# Patient Record
Sex: MALE | Race: WHITE | NOT HISPANIC OR LATINO | URBAN - METROPOLITAN AREA
[De-identification: names, ages, dates, MRNs, and addresses within clinical notes are randomized per-mention and may not be internally consistent; named-entity substitution may affect disease eponyms.]

---

## 2020-09-14 ENCOUNTER — TELEPHONE (OUTPATIENT)
Dept: ORTHOPEDIC SURGERY | Facility: CLINIC | Age: 40
End: 2020-09-14

## 2020-09-21 ENCOUNTER — OFFICE VISIT (OUTPATIENT)
Dept: ORTHOPEDIC SURGERY | Facility: CLINIC | Age: 40
End: 2020-09-21

## 2020-09-21 VITALS
DIASTOLIC BLOOD PRESSURE: 90 MMHG | SYSTOLIC BLOOD PRESSURE: 135 MMHG | BODY MASS INDEX: 39.14 KG/M2 | WEIGHT: 289 LBS | HEART RATE: 98 BPM | HEIGHT: 72 IN

## 2020-09-21 DIAGNOSIS — M25.561 RIGHT KNEE PAIN, UNSPECIFIED CHRONICITY: Primary | ICD-10-CM

## 2020-09-21 DIAGNOSIS — M17.11 PRIMARY OSTEOARTHRITIS OF RIGHT KNEE: ICD-10-CM

## 2020-09-21 PROCEDURE — 73562 X-RAY EXAM OF KNEE 3: CPT | Performed by: NURSE PRACTITIONER

## 2020-09-21 PROCEDURE — 99203 OFFICE O/P NEW LOW 30 MIN: CPT | Performed by: NURSE PRACTITIONER

## 2020-09-21 RX ORDER — OMEPRAZOLE 20 MG/1
20 CAPSULE, DELAYED RELEASE ORAL DAILY
COMMUNITY
End: 2021-08-22

## 2020-09-21 RX ORDER — LIRAGLUTIDE 6 MG/ML
INJECTION, SOLUTION SUBCUTANEOUS
Status: ON HOLD | COMMUNITY
Start: 2020-08-10 | End: 2021-12-22

## 2020-09-21 RX ORDER — MELOXICAM 15 MG/1
15 TABLET ORAL DAILY
Qty: 30 TABLET | Refills: 2 | Status: SHIPPED | OUTPATIENT
Start: 2020-09-21 | End: 2021-07-09
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
## 2020-09-22 ENCOUNTER — TELEPHONE (OUTPATIENT)
Dept: ORTHOPEDIC SURGERY | Facility: CLINIC | Age: 40
End: 2020-09-22

## 2020-09-22 NOTE — TELEPHONE ENCOUNTER
LM for pt to call and make appt for Gel-One inj B&B Rt knee. Waiting for call back to make an appt.

## 2020-09-23 ENCOUNTER — CLINICAL SUPPORT (OUTPATIENT)
Dept: ORTHOPEDIC SURGERY | Facility: CLINIC | Age: 40
End: 2020-09-23

## 2020-09-23 DIAGNOSIS — M25.561 RIGHT KNEE PAIN, UNSPECIFIED CHRONICITY: Primary | ICD-10-CM

## 2020-09-23 DIAGNOSIS — M17.11 PRIMARY OSTEOARTHRITIS OF RIGHT KNEE: ICD-10-CM

## 2020-09-23 PROCEDURE — 20610 DRAIN/INJ JOINT/BURSA W/O US: CPT | Performed by: NURSE PRACTITIONER

## 2020-09-23 NOTE — PROGRESS NOTES
Procedure   Large Joint Arthrocentesis: R knee  Date/Time: 9/23/2020 8:10 AM  Consent given by: patient  Site marked: site marked  Timeout: Immediately prior to procedure a time out was called to verify the correct patient, procedure, equipment, support staff and site/side marked as required   Supporting Documentation  Indications: pain   Procedure Details  Location: knee - R knee (BILATERAL)  Preparation: Patient was prepped and draped in the usual sterile fashion  Needle size: 20 G  Approach: anterolateral  Medications administered: 30 mg Cross-Linked Hyaluronate 30 MG/3ML  Patient tolerance: patient tolerated the procedure well with no immediate complications      Patient presents to clinic today for right knee viscosupplement injections.  This is the sinle injection of the series.  I explained details of injections as well as risks, benefits and alternatives with the patient today, had all questions answered, wished to proceed with injections.  I will see patient back in 6 weeks for re-evaluation. Patient was instructed to watch for signs or symptoms of infection including redness, swelling, warmth to the touch, or significant increased pain and to contact our office immediately if any of these issues were noted.

## 2020-11-06 ENCOUNTER — OFFICE VISIT (OUTPATIENT)
Dept: ORTHOPEDIC SURGERY | Facility: CLINIC | Age: 40
End: 2020-11-06

## 2020-11-06 VITALS — WEIGHT: 289 LBS | BODY MASS INDEX: 39.14 KG/M2 | HEIGHT: 72 IN

## 2020-11-06 DIAGNOSIS — M17.11 PRIMARY OSTEOARTHRITIS OF RIGHT KNEE: Primary | ICD-10-CM

## 2020-11-06 PROCEDURE — 99214 OFFICE O/P EST MOD 30 MIN: CPT | Performed by: NURSE PRACTITIONER

## 2020-11-06 RX ORDER — METHYLPREDNISOLONE 4 MG/1
TABLET ORAL
Qty: 21 TABLET | Refills: 0 | Status: SHIPPED | OUTPATIENT
Start: 2020-11-06 | End: 2021-08-22
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
## 2021-07-09 RX ORDER — MELOXICAM 15 MG/1
15 TABLET ORAL DAILY
Qty: 30 TABLET | Refills: 1 | Status: SHIPPED | OUTPATIENT
Start: 2021-07-09 | End: 2023-01-11

## 2021-08-10 ENCOUNTER — HOSPITAL ENCOUNTER (OUTPATIENT)
Dept: ULTRASOUND IMAGING | Facility: HOSPITAL | Age: 41
Discharge: HOME | End: 2021-08-10
Admitting: NURSE PRACTITIONER

## 2021-08-10 ENCOUNTER — TRANSCRIBE ORDERS (OUTPATIENT)
Dept: ADMINISTRATIVE | Facility: HOSPITAL | Age: 41
End: 2021-08-10

## 2021-08-10 DIAGNOSIS — R60.0 LOCALIZED EDEMA: ICD-10-CM

## 2021-08-10 DIAGNOSIS — R60.0 LOCALIZED EDEMA: Primary | ICD-10-CM

## 2021-08-10 PROCEDURE — 93971 EXTREMITY STUDY: CPT

## 2021-08-17 ENCOUNTER — OFFICE VISIT (OUTPATIENT)
Dept: ORTHOPEDIC SURGERY | Facility: CLINIC | Age: 41
End: 2021-08-17

## 2021-08-17 VITALS — BODY MASS INDEX: 39.14 KG/M2 | WEIGHT: 289 LBS | HEIGHT: 72 IN

## 2021-08-17 DIAGNOSIS — M17.11 PRIMARY OSTEOARTHRITIS OF RIGHT KNEE: Primary | ICD-10-CM

## 2021-08-17 PROCEDURE — 73562 X-RAY EXAM OF KNEE 3: CPT | Performed by: NURSE PRACTITIONER

## 2021-08-17 PROCEDURE — 20610 DRAIN/INJ JOINT/BURSA W/O US: CPT | Performed by: NURSE PRACTITIONER

## 2021-08-17 PROCEDURE — 99214 OFFICE O/P EST MOD 30 MIN: CPT | Performed by: NURSE PRACTITIONER

## 2021-08-17 RX ORDER — CEPHALEXIN 500 MG/1
CAPSULE ORAL
COMMUNITY
Start: 2021-08-11 | End: 2021-09-15

## 2021-08-17 RX ADMIN — TRIAMCINOLONE ACETONIDE 80 MG: 40 INJECTION, SUSPENSION INTRA-ARTICULAR; INTRAMUSCULAR at 10:39

## 2021-08-17 RX ADMIN — LIDOCAINE HYDROCHLORIDE 8 ML: 10 INJECTION, SOLUTION EPIDURAL; INFILTRATION; INTRACAUDAL; PERINEURAL at 10:39
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
## 2021-08-23 RX ORDER — LIDOCAINE HYDROCHLORIDE 10 MG/ML
8 INJECTION, SOLUTION EPIDURAL; INFILTRATION; INTRACAUDAL; PERINEURAL
Status: COMPLETED | OUTPATIENT
Start: 2021-08-17 | End: 2021-08-17

## 2021-08-23 RX ORDER — TRIAMCINOLONE ACETONIDE 40 MG/ML
80 INJECTION, SUSPENSION INTRA-ARTICULAR; INTRAMUSCULAR
Status: COMPLETED | OUTPATIENT
Start: 2021-08-17 | End: 2021-08-17

## 2021-09-15 ENCOUNTER — OFFICE VISIT (OUTPATIENT)
Dept: ORTHOPEDIC SURGERY | Facility: CLINIC | Age: 41
End: 2021-09-15

## 2021-09-15 VITALS — WEIGHT: 289 LBS | BODY MASS INDEX: 39.14 KG/M2 | HEIGHT: 72 IN

## 2021-09-15 DIAGNOSIS — M17.11 PRIMARY OSTEOARTHRITIS OF RIGHT KNEE: Primary | ICD-10-CM

## 2021-09-15 DIAGNOSIS — M25.561 MECHANICAL KNEE PAIN, RIGHT: ICD-10-CM

## 2021-09-15 PROCEDURE — 99213 OFFICE O/P EST LOW 20 MIN: CPT | Performed by: NURSE PRACTITIONER
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
## 2021-09-30 ENCOUNTER — HOSPITAL ENCOUNTER (OUTPATIENT)
Dept: MRI IMAGING | Facility: HOSPITAL | Age: 41
Discharge: HOME | End: 2021-09-30
Admitting: NURSE PRACTITIONER

## 2021-09-30 DIAGNOSIS — M25.561 MECHANICAL KNEE PAIN, RIGHT: ICD-10-CM

## 2021-09-30 PROCEDURE — 73721 MRI JNT OF LWR EXTRE W/O DYE: CPT

## 2021-10-14 ENCOUNTER — OFFICE VISIT (OUTPATIENT)
Dept: ORTHOPEDIC SURGERY | Facility: CLINIC | Age: 41
End: 2021-10-14

## 2021-10-14 VITALS — WEIGHT: 305 LBS | BODY MASS INDEX: 41.31 KG/M2 | HEIGHT: 72 IN

## 2021-10-14 DIAGNOSIS — S83.231A COMPLEX TEAR OF MEDIAL MENISCUS OF RIGHT KNEE AS CURRENT INJURY, INITIAL ENCOUNTER: Primary | ICD-10-CM

## 2021-10-14 DIAGNOSIS — M17.11 PRIMARY OSTEOARTHRITIS OF RIGHT KNEE: ICD-10-CM

## 2021-10-14 PROCEDURE — 99214 OFFICE O/P EST MOD 30 MIN: CPT | Performed by: ORTHOPAEDIC SURGERY
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
## 2021-10-19 ENCOUNTER — TELEPHONE (OUTPATIENT)
Dept: ORTHOPEDIC SURGERY | Facility: CLINIC | Age: 41
End: 2021-10-19

## 2021-10-21 RX ORDER — MELOXICAM 7.5 MG/1
15 TABLET ORAL ONCE
Status: CANCELLED | OUTPATIENT
Start: 2021-10-21 | End: 2021-10-21

## 2021-10-21 RX ORDER — CEFAZOLIN SODIUM IN 0.9 % NACL 3 G/100 ML
3 INTRAVENOUS SOLUTION, PIGGYBACK (ML) INTRAVENOUS ONCE
Status: CANCELLED | OUTPATIENT
Start: 2021-12-22 | End: 2021-10-21

## 2021-10-21 RX ORDER — ACETAMINOPHEN 325 MG/1
1000 TABLET ORAL ONCE
Status: CANCELLED | OUTPATIENT
Start: 2021-10-21 | End: 2021-10-21

## 2021-10-21 RX ORDER — PREGABALIN 150 MG/1
150 CAPSULE ORAL ONCE
Status: CANCELLED | OUTPATIENT
Start: 2021-10-21 | End: 2021-10-21

## 2021-11-03 ENCOUNTER — TELEPHONE (OUTPATIENT)
Dept: ORTHOPEDIC SURGERY | Facility: CLINIC | Age: 41
End: 2021-11-03

## 2021-11-03 NOTE — TELEPHONE ENCOUNTER
Patients MRI knee is being denied for his upcoming surgery (per the  authorization department). If you'd like to do a peer to peer you need to call [phone], patient ID [id number].  Scheduled for this Friday 11/5 @  [facility].

## 2021-11-05 ENCOUNTER — HOSPITAL ENCOUNTER (OUTPATIENT)
Dept: MRI IMAGING | Facility: HOSPITAL | Age: 41
End: 2021-11-05

## 2021-11-16 ENCOUNTER — TELEPHONE (OUTPATIENT)
Dept: ORTHOPEDIC SURGERY | Facility: CLINIC | Age: 41
End: 2021-11-16

## 2021-11-16 ENCOUNTER — HOSPITAL ENCOUNTER (OUTPATIENT)
Dept: MRI IMAGING | Facility: HOSPITAL | Age: 41
Discharge: HOME | End: 2021-11-16
Admitting: ORTHOPAEDIC SURGERY

## 2021-11-16 DIAGNOSIS — M17.11 PRIMARY OSTEOARTHRITIS OF RIGHT KNEE: ICD-10-CM

## 2021-11-16 PROCEDURE — 73721 MRI JNT OF LWR EXTRE W/O DYE: CPT

## 2021-11-16 NOTE — TELEPHONE ENCOUNTER
I CALLED [facility] TO GET ANOTHER PEER TO PEER REVIEW FOR MRI DONE TODAY. I WAS DENIED, BUT WAS TOLD THAT I CAN CALL [facility] DIRECTLY AND LET THEM KNOW THAT WE WANTED TO APPEAL THE DENIAL FROM [facility].  I  CALLED I SPOKE WITH KEYSHAWN SANDS WITH [facility] AND TOLD HIM THAT WE WANTED TO APPEAL THE FACT THAT [facility] DENIED THE MRI NEEDED FOR PATIENT SURGERY. KEYSHAWN GALLAGHER SAID IT DOES NOT NEED AN AUTH. QUESTIONED HIM 3 TIMES STILL INSISTED WE DID NOT NEED AN AUTH REF # [id number]

## 2021-11-16 NOTE — TELEPHONE ENCOUNTER
CALLED [facility] AGAIN AND GOT MAY P. REF# [id number] AND SHE SAID IT DID NEED AN AUTH. SHE GAVE ME [phone]  TO JOSE C

## 2021-12-07 ENCOUNTER — PRE-ADMISSION TESTING (OUTPATIENT)
Dept: PREADMISSION TESTING | Facility: HOSPITAL | Age: 41
End: 2021-12-07

## 2021-12-07 VITALS
BODY MASS INDEX: 39.89 KG/M2 | SYSTOLIC BLOOD PRESSURE: 151 MMHG | HEIGHT: 73 IN | WEIGHT: 301 LBS | RESPIRATION RATE: 20 BRPM | DIASTOLIC BLOOD PRESSURE: 97 MMHG | OXYGEN SATURATION: 98 % | HEART RATE: 88 BPM

## 2021-12-07 DIAGNOSIS — S83.231A COMPLEX TEAR OF MEDIAL MENISCUS OF RIGHT KNEE AS CURRENT INJURY, INITIAL ENCOUNTER: ICD-10-CM

## 2021-12-07 DIAGNOSIS — M17.11 PRIMARY OSTEOARTHRITIS OF RIGHT KNEE: ICD-10-CM

## 2021-12-07 DIAGNOSIS — M17.11 PRIMARY OSTEOARTHRITIS OF RIGHT KNEE: Primary | ICD-10-CM

## 2021-12-07 LAB
ABO GROUP BLD: NORMAL
ANION GAP SERPL CALCULATED.3IONS-SCNC: 8.7 MMOL/L (ref 5–15)
APTT PPP: 32 SECONDS (ref 24.3–38.1)
BASOPHILS # BLD AUTO: 0.03 10*3/MM3 (ref 0–0.2)
BASOPHILS NFR BLD AUTO: 0.4 % (ref 0–1.5)
BILIRUB UR QL STRIP: NEGATIVE
BLD GP AB SCN SERPL QL: NEGATIVE
BUN SERPL-MCNC: 13 MG/DL (ref 6–20)
BUN/CREAT SERPL: 18.1 (ref 7–25)
CALCIUM SPEC-SCNC: 9.4 MG/DL (ref 8.6–10.5)
CHLORIDE SERPL-SCNC: 101 MMOL/L (ref 98–107)
CLARITY UR: CLEAR
CO2 SERPL-SCNC: 26.3 MMOL/L (ref 22–29)
COLOR UR: YELLOW
CREAT SERPL-MCNC: 0.72 MG/DL (ref 0.76–1.27)
DEPRECATED RDW RBC AUTO: 37.8 FL (ref 37–54)
EOSINOPHIL # BLD AUTO: 0.09 10*3/MM3 (ref 0–0.4)
EOSINOPHIL NFR BLD AUTO: 1.3 % (ref 0.3–6.2)
ERYTHROCYTE [DISTWIDTH] IN BLOOD BY AUTOMATED COUNT: 11.9 % (ref 12.3–15.4)
GFR SERPL CREATININE-BSD FRML MDRD: 120 ML/MIN/1.73
GLUCOSE SERPL-MCNC: 107 MG/DL (ref 65–99)
GLUCOSE UR STRIP-MCNC: NEGATIVE MG/DL
HBA1C MFR BLD: 5.4 % (ref 4.8–5.6)
HCT VFR BLD AUTO: 44.5 % (ref 37.5–51)
HGB BLD-MCNC: 14.9 G/DL (ref 13–17.7)
HGB UR QL STRIP.AUTO: NEGATIVE
IMM GRANULOCYTES # BLD AUTO: 0.03 10*3/MM3 (ref 0–0.05)
IMM GRANULOCYTES NFR BLD AUTO: 0.4 % (ref 0–0.5)
INR PPP: 1.04 (ref 0.9–1.1)
KETONES UR QL STRIP: NEGATIVE
LEUKOCYTE ESTERASE UR QL STRIP.AUTO: NEGATIVE
LYMPHOCYTES # BLD AUTO: 1.27 10*3/MM3 (ref 0.7–3.1)
LYMPHOCYTES NFR BLD AUTO: 17.9 % (ref 19.6–45.3)
MCH RBC QN AUTO: 29 PG (ref 26.6–33)
MCHC RBC AUTO-ENTMCNC: 33.5 G/DL (ref 31.5–35.7)
MCV RBC AUTO: 86.6 FL (ref 79–97)
MONOCYTES # BLD AUTO: 0.41 10*3/MM3 (ref 0.1–0.9)
MONOCYTES NFR BLD AUTO: 5.8 % (ref 5–12)
NEUTROPHILS NFR BLD AUTO: 5.27 10*3/MM3 (ref 1.7–7)
NEUTROPHILS NFR BLD AUTO: 74.2 % (ref 42.7–76)
NITRITE UR QL STRIP: NEGATIVE
NRBC BLD AUTO-RTO: 0 /100 WBC (ref 0–0.2)
PH UR STRIP.AUTO: 6 [PH] (ref 4.5–8)
PLATELET # BLD AUTO: 327 10*3/MM3 (ref 140–450)
PMV BLD AUTO: 10.2 FL (ref 6–12)
POTASSIUM SERPL-SCNC: 4.5 MMOL/L (ref 3.5–5.2)
PROT UR QL STRIP: NEGATIVE
PROTHROMBIN TIME: 13.8 SECONDS (ref 12.1–15)
QT INTERVAL: 389 MS
RBC # BLD AUTO: 5.14 10*6/MM3 (ref 4.14–5.8)
RH BLD: POSITIVE
SODIUM SERPL-SCNC: 136 MMOL/L (ref 136–145)
SP GR UR STRIP: 1.02 (ref 1–1.03)
T&S EXPIRATION DATE: NORMAL
UROBILINOGEN UR QL STRIP: NORMAL
WBC NRBC COR # BLD: 7.1 10*3/MM3 (ref 3.4–10.8)

## 2021-12-07 PROCEDURE — 87081 CULTURE SCREEN ONLY: CPT | Performed by: ORTHOPAEDIC SURGERY

## 2021-12-07 PROCEDURE — 86900 BLOOD TYPING SEROLOGIC ABO: CPT | Performed by: ORTHOPAEDIC SURGERY

## 2021-12-07 PROCEDURE — 36415 COLL VENOUS BLD VENIPUNCTURE: CPT

## 2021-12-07 PROCEDURE — 85025 COMPLETE CBC W/AUTO DIFF WBC: CPT | Performed by: ORTHOPAEDIC SURGERY

## 2021-12-07 PROCEDURE — 85610 PROTHROMBIN TIME: CPT | Performed by: ORTHOPAEDIC SURGERY

## 2021-12-07 PROCEDURE — 81003 URINALYSIS AUTO W/O SCOPE: CPT | Performed by: ORTHOPAEDIC SURGERY

## 2021-12-07 PROCEDURE — 86901 BLOOD TYPING SEROLOGIC RH(D): CPT | Performed by: ORTHOPAEDIC SURGERY

## 2021-12-07 PROCEDURE — 86850 RBC ANTIBODY SCREEN: CPT | Performed by: ORTHOPAEDIC SURGERY

## 2021-12-07 PROCEDURE — 93005 ELECTROCARDIOGRAM TRACING: CPT

## 2021-12-07 PROCEDURE — 80048 BASIC METABOLIC PNL TOTAL CA: CPT | Performed by: ORTHOPAEDIC SURGERY

## 2021-12-07 PROCEDURE — 83036 HEMOGLOBIN GLYCOSYLATED A1C: CPT | Performed by: ORTHOPAEDIC SURGERY

## 2021-12-07 PROCEDURE — 93010 ELECTROCARDIOGRAM REPORT: CPT | Performed by: INTERNAL MEDICINE

## 2021-12-07 PROCEDURE — 85730 THROMBOPLASTIN TIME PARTIAL: CPT | Performed by: ORTHOPAEDIC SURGERY

## 2021-12-07 RX ORDER — PHENTERMINE HYDROCHLORIDE 37.5 MG/1
37.5 TABLET ORAL
COMMUNITY
Start: 2021-11-19 | End: 2022-10-17 | Stop reason: SDUPTHER

## 2021-12-07 ASSESSMENT — KOOS JR
KOOS JR SCORE: 15
KOOS JR SCORE: 50.012

## 2021-12-07 NOTE — PAT
Pt here for PAT visit and joint camp.  Pre-op tests completed, chg soap given, and instructions reviewed.  Instructed clears/Gatorade until 2 hrs PTA, voiced understanding. Covid test 12/20/21. Instructed pt to d/c phentermine until after procedure. Instructed to start bactroban 12/17/21. ERAS explained.

## 2021-12-07 NOTE — DISCHARGE INSTRUCTIONS
PRE-ADMISSION TESTING INSTRUCTIONS FOR TOTAL JOINT PATIENTS    Take these medications the morning of surgery with a small sip of water: DEXILANT      No aspirin, advil, aleve, ibuprofen, naproxen, diet pills, decongestants, or vitamin/herbal supplements for a week prior to your surgery. TYLENOL OK IF NEEDED    Do not take any insulin or diabetes medications the morning of surgery.      Start your Bactroban ointment on __12/17_________.  You will need to apply the ointment with a clean Q-tip 3 times a day in both sides of your nose for 5 days and the morning of surgery.    General Instructions:    • Do not eat solid food after midnight the night before surgery.  No gum, mints, or hard candy after midnight the night before surgery.  • You may drink clear liquids the day of surgery up until 2 hours before your arrival time.  • Clear liquids are liquids you can see through. Nothing RED in color.    Plain water    Sports drinks  Sodas     Gelatin (Jell-O)  Fruit juices without pulp such as white grape juice and apple juice  Popsicles that contain no fruit or yogurt  Tea or coffee (no cream or milk added)    • It is beneficial for you to have a clear drink that contains carbohydrates just before you leave your house and before your fasting time begins.  We suggest a 20 ounce bottle of Gatorade or Powerade for non-diabetic patients or a 20 ounce bottle of G2 or Powerade Zero for diabetic patients.     • Patients who avoid smoking, chewing tobacco and alcohol for 4 weeks prior to surgery have a reduced risk of post-operative complications.  If at all possible, quit smoking as many days before surgery as you can.    • Do not smoke, use chewing tobacco or drink alcohol after midnight the day of surgery.    • Bring your C-PAP/ BI-PAP machine if you use one.  • Wear clean comfortable clothes and socks.  • Do not wear contact lenses, lotion, deodorant, or make-up.  Bring a case for your glasses if applicable.   • You may brush  your teeth the morning of surgery.  • You may wear dentures/partials, do not put adhesive/glue on them.  • Leave all other jewelry and valuables at home.  • NOTIFY YOUR SURGEON IF YOU BECOME ILL, HAVE A FEVER, PRODUCTIVE COUGH, OR CANNOT BE HERE THE DAY OF SURGERY.      Preventing a Surgical Site Infection:    • Shower the night before and on the morning of surgery using the chlorhexidine soap you were given.  Use a clean washcloth with the soap.  Place clean sheets on your bed after showering the night before surgery. Do not use the CHG soap on your hair, face, or private areas. Wash your body gently for five (5) minutes. Do not scrub your skin.  Dry with a clean towel and dress in clean clothing.    • Do not shave the surgical area for 10 days-2 weeks prior to surgery  because the razor can irritate skin and make it easier to develop an infection.  • Make sure you, your family, and all healthcare providers clean their hands with soap and water or an alcohol based hand  before caring for you or your wound.      Day of surgery:    Your surgeon’s office will advise you of your arrival time for the day of surgery.    Upon arrival, a Pre-op nurse and Anesthesia provider will review your health history, obtain vital signs, and answer questions you may have.  The only belongings needed at this time will be your home medications and if applicable your C-PAP/BI-PAP machine.  If you are staying overnight your family can leave the rest of your belongings in the car and bring them to your room later.  A Pre-op nurse will start an IV and you may receive medication in preparation for surgery, including something to help you relax.  Your family will be able to see you in the Pre-op area.  While you are in surgery your family should notify the waiting room  if they leave the waiting room area and provide a contact phone number.    If you have any questions, you can call the Pre-Admission Department at [phone] or your surgeon's office.    Please be aware that surgery does come with discomfort.  We want to make every effort to control your discomfort so please discuss any uncontrolled symptoms with your nurse.   Your doctor will most likely have prescribed pain medications.     You may have bruising or discomfort from the tourniquet used in surgery.     Please leave all luggage in the car the morning of surgery.  You will be transported to your hospital room following the recovery period.  Your family can get your luggage at that time.      You may receive a survey regarding the care you received. Your feedback is very important and will be used to collect the necessary data to help us to continue to provide excellent care.

## 2021-12-08 LAB — MRSA SPEC QL CULT: NORMAL

## 2021-12-16 ENCOUNTER — OFFICE VISIT (OUTPATIENT)
Dept: ORTHOPEDIC SURGERY | Facility: CLINIC | Age: 41
End: 2021-12-16

## 2021-12-16 VITALS — WEIGHT: 301 LBS | HEIGHT: 73 IN | BODY MASS INDEX: 39.89 KG/M2

## 2021-12-16 DIAGNOSIS — M17.11 PRIMARY OSTEOARTHRITIS OF RIGHT KNEE: Primary | ICD-10-CM

## 2021-12-16 PROCEDURE — 99024 POSTOP FOLLOW-UP VISIT: CPT | Performed by: ORTHOPAEDIC SURGERY

## 2021-12-16 NOTE — PROGRESS NOTES
Cc: f/u right knee pain, DJD    Patient presented to clinic today for preoperative history and physical visit in anticipation of upcoming scheduled right partial knee arthroplasty.    I reviewed anatomy and a model of a partial knee arthroplasty, as well as typical postoperative recovery of 6-12 months before maximal recovery, and possible need for rehabilitation stay after hospitalization. We also discussed risks, benefits, and alternatives of procedure with risks including but not limited to neurovascular damage, bleeding, infection, malalignment, chronic pian, failure of implants, osteolysis, loosening of implants, loss of motion, weakness, stiffness, instability, DVT, pulmonary embolus, death, stroke, complex regional pain syndrome, myocardial infarction, and need for additional procedures. Patient understood all these and had all questions answered before consenting to the procedure. No guarantees were given in regards to results from the surgery.  Patient has been medically optimize by his primary care physician.    Postoperative DVT prophylaxis will be with aspirin     I have given patient prescription today as well for Bactroban for nasal swabs     All remaining questions answered today.
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
## 2021-12-20 ENCOUNTER — LAB (OUTPATIENT)
Dept: LAB | Facility: HOSPITAL | Age: 41
End: 2021-12-20

## 2021-12-20 DIAGNOSIS — M17.11 PRIMARY OSTEOARTHRITIS OF RIGHT KNEE: ICD-10-CM

## 2021-12-20 DIAGNOSIS — S83.231A COMPLEX TEAR OF MEDIAL MENISCUS OF RIGHT KNEE AS CURRENT INJURY, INITIAL ENCOUNTER: ICD-10-CM

## 2021-12-20 LAB — SARS-COV-2 RNA PNL SPEC NAA+PROBE: NOT DETECTED

## 2021-12-20 PROCEDURE — 87635 SARS-COV-2 COVID-19 AMP PRB: CPT | Performed by: ORTHOPAEDIC SURGERY

## 2021-12-20 PROCEDURE — C9803 HOPD COVID-19 SPEC COLLECT: HCPCS

## 2021-12-21 ENCOUNTER — PREP FOR SURGERY (OUTPATIENT)
Dept: OTHER | Facility: HOSPITAL | Age: 41
End: 2021-12-21

## 2021-12-21 ENCOUNTER — ANESTHESIA EVENT (OUTPATIENT)
Dept: PERIOP | Facility: HOSPITAL | Age: 41
End: 2021-12-21

## 2021-12-22 ENCOUNTER — APPOINTMENT (OUTPATIENT)
Dept: GENERAL RADIOLOGY | Facility: HOSPITAL | Age: 41
End: 2021-12-22

## 2021-12-22 ENCOUNTER — ANESTHESIA (OUTPATIENT)
Dept: PERIOP | Facility: HOSPITAL | Age: 41
End: 2021-12-22

## 2021-12-22 ENCOUNTER — HOSPITAL ENCOUNTER (OUTPATIENT)
Facility: HOSPITAL | Age: 41
Discharge: HOME | End: 2021-12-23
Attending: ORTHOPAEDIC SURGERY | Admitting: ORTHOPAEDIC SURGERY

## 2021-12-22 DIAGNOSIS — S83.231A COMPLEX TEAR OF MEDIAL MENISCUS OF RIGHT KNEE AS CURRENT INJURY, INITIAL ENCOUNTER: ICD-10-CM

## 2021-12-22 DIAGNOSIS — M17.11 PRIMARY OSTEOARTHRITIS OF RIGHT KNEE: ICD-10-CM

## 2021-12-22 DIAGNOSIS — Z96.651 STATUS POST RIGHT PARTIAL KNEE REPLACEMENT: Primary | ICD-10-CM

## 2021-12-22 PROCEDURE — 76942 ECHO GUIDE FOR BIOPSY: CPT | Performed by: ORTHOPAEDIC SURGERY

## 2021-12-22 PROCEDURE — 25010000002 DEXAMETHASONE PER 1 MG: Performed by: NURSE ANESTHETIST, CERTIFIED REGISTERED

## 2021-12-22 PROCEDURE — 25010000002 MIDAZOLAM PER 1MG: Performed by: NURSE ANESTHETIST, CERTIFIED REGISTERED

## 2021-12-22 PROCEDURE — C1889 IMPLANT/INSERT DEVICE, NOC: HCPCS | Performed by: ORTHOPAEDIC SURGERY

## 2021-12-22 PROCEDURE — 25010000002 DIPHENHYDRAMINE PER 50 MG: Performed by: NURSE ANESTHETIST, CERTIFIED REGISTERED

## 2021-12-22 PROCEDURE — 27446 REVISION OF KNEE JOINT: CPT | Performed by: ORTHOPAEDIC SURGERY

## 2021-12-22 PROCEDURE — 25010000002 ONDANSETRON PER 1 MG: Performed by: NURSE ANESTHETIST, CERTIFIED REGISTERED

## 2021-12-22 PROCEDURE — 73560 X-RAY EXAM OF KNEE 1 OR 2: CPT

## 2021-12-22 PROCEDURE — 25010000002 MORPHINE PER 10 MG: Performed by: ORTHOPAEDIC SURGERY

## 2021-12-22 PROCEDURE — 94799 UNLISTED PULMONARY SVC/PX: CPT

## 2021-12-22 PROCEDURE — 25010000002 VANCOMYCIN 1 G RECONSTITUTED SOLUTION: Performed by: ORTHOPAEDIC SURGERY

## 2021-12-22 PROCEDURE — G0378 HOSPITAL OBSERVATION PER HR: HCPCS

## 2021-12-22 PROCEDURE — 27446 REVISION OF KNEE JOINT: CPT | Performed by: SPECIALIST/TECHNOLOGIST, OTHER

## 2021-12-22 PROCEDURE — C1713 ANCHOR/SCREW BN/BN,TIS/BN: HCPCS | Performed by: ORTHOPAEDIC SURGERY

## 2021-12-22 PROCEDURE — C1776 JOINT DEVICE (IMPLANTABLE): HCPCS | Performed by: ORTHOPAEDIC SURGERY

## 2021-12-22 PROCEDURE — 0 CEFAZOLIN PER 500 MG: Performed by: ORTHOPAEDIC SURGERY

## 2021-12-22 PROCEDURE — 25010000002 PROPOFOL 10 MG/ML EMULSION: Performed by: NURSE ANESTHETIST, CERTIFIED REGISTERED

## 2021-12-22 DEVICE — IMPLANTABLE DEVICE: Type: IMPLANTABLE DEVICE | Site: KNEE | Status: FUNCTIONAL

## 2021-12-22 DEVICE — CMT BONE REFOBACIN R W/GENT 1X40: Type: IMPLANTABLE DEVICE | Site: KNEE | Status: FUNCTIONAL

## 2021-12-22 DEVICE — DEV CONTRL TISS STRATAFIX SPIRAL MNCRYL UD 3/0 PLS 45CM: Type: IMPLANTABLE DEVICE | Site: KNEE | Status: FUNCTIONAL

## 2021-12-22 DEVICE — CAP PART KN VANGUARD M: Type: IMPLANTABLE DEVICE | Site: KNEE | Status: FUNCTIONAL

## 2021-12-22 DEVICE — DEV WND/CLS CONTRL TISS STRATAFIX SYMM PDS PLS CTX 60CM VIL: Type: IMPLANTABLE DEVICE | Site: KNEE | Status: FUNCTIONAL

## 2021-12-22 RX ORDER — BUPIVACAINE HYDROCHLORIDE 2.5 MG/ML
INJECTION, SOLUTION EPIDURAL; INFILTRATION; INTRACAUDAL
Status: COMPLETED | OUTPATIENT
Start: 2021-12-22 | End: 2021-12-22

## 2021-12-22 RX ORDER — ACETAMINOPHEN 500 MG
1000 TABLET ORAL ONCE
Status: COMPLETED | OUTPATIENT
Start: 2021-12-22 | End: 2021-12-22

## 2021-12-22 RX ORDER — ONDANSETRON 4 MG/1
4 TABLET, FILM COATED ORAL EVERY 6 HOURS PRN
Status: DISCONTINUED | OUTPATIENT
Start: 2021-12-22 | End: 2021-12-23 | Stop reason: HOSPADM

## 2021-12-22 RX ORDER — NALOXONE HCL 0.4 MG/ML
0.4 VIAL (ML) INJECTION
Status: DISCONTINUED | OUTPATIENT
Start: 2021-12-22 | End: 2021-12-23 | Stop reason: HOSPADM

## 2021-12-22 RX ORDER — VANCOMYCIN HYDROCHLORIDE 1 G/20ML
INJECTION, POWDER, LYOPHILIZED, FOR SOLUTION INTRAVENOUS AS NEEDED
Status: DISCONTINUED | OUTPATIENT
Start: 2021-12-22 | End: 2021-12-22 | Stop reason: HOSPADM

## 2021-12-22 RX ORDER — LIDOCAINE HYDROCHLORIDE 10 MG/ML
0.5 INJECTION, SOLUTION EPIDURAL; INFILTRATION; INTRACAUDAL; PERINEURAL ONCE AS NEEDED
Status: DISCONTINUED | OUTPATIENT
Start: 2021-12-22 | End: 2021-12-22 | Stop reason: HOSPADM

## 2021-12-22 RX ORDER — OXYCODONE HYDROCHLORIDE 5 MG/1
5 TABLET ORAL EVERY 4 HOURS PRN
Status: DISCONTINUED | OUTPATIENT
Start: 2021-12-22 | End: 2021-12-23 | Stop reason: HOSPADM

## 2021-12-22 RX ORDER — SODIUM CHLORIDE 9 MG/ML
40 INJECTION, SOLUTION INTRAVENOUS AS NEEDED
Status: DISCONTINUED | OUTPATIENT
Start: 2021-12-22 | End: 2021-12-22 | Stop reason: HOSPADM

## 2021-12-22 RX ORDER — ONDANSETRON 2 MG/ML
4 INJECTION INTRAMUSCULAR; INTRAVENOUS ONCE AS NEEDED
Status: COMPLETED | OUTPATIENT
Start: 2021-12-22 | End: 2021-12-22

## 2021-12-22 RX ORDER — SODIUM CHLORIDE, SODIUM LACTATE, POTASSIUM CHLORIDE, CALCIUM CHLORIDE 600; 310; 30; 20 MG/100ML; MG/100ML; MG/100ML; MG/100ML
100 INJECTION, SOLUTION INTRAVENOUS CONTINUOUS
Status: DISCONTINUED | OUTPATIENT
Start: 2021-12-22 | End: 2021-12-23 | Stop reason: HOSPADM

## 2021-12-22 RX ORDER — SODIUM CHLORIDE 0.9 % (FLUSH) 0.9 %
10 SYRINGE (ML) INJECTION AS NEEDED
Status: DISCONTINUED | OUTPATIENT
Start: 2021-12-22 | End: 2021-12-22 | Stop reason: HOSPADM

## 2021-12-22 RX ORDER — PREGABALIN 75 MG/1
75 CAPSULE ORAL EVERY 12 HOURS SCHEDULED
Status: DISCONTINUED | OUTPATIENT
Start: 2021-12-22 | End: 2021-12-23 | Stop reason: HOSPADM

## 2021-12-22 RX ORDER — MORPHINE SULFATE 10 MG/ML
6 INJECTION INTRAMUSCULAR; INTRAVENOUS; SUBCUTANEOUS
Status: DISCONTINUED | OUTPATIENT
Start: 2021-12-22 | End: 2021-12-23 | Stop reason: HOSPADM

## 2021-12-22 RX ORDER — MELOXICAM 7.5 MG/1
15 TABLET ORAL ONCE
Status: COMPLETED | OUTPATIENT
Start: 2021-12-22 | End: 2021-12-22

## 2021-12-22 RX ORDER — MIDAZOLAM HYDROCHLORIDE 2 MG/2ML
1 INJECTION, SOLUTION INTRAMUSCULAR; INTRAVENOUS
Status: COMPLETED | OUTPATIENT
Start: 2021-12-22 | End: 2021-12-22

## 2021-12-22 RX ORDER — MAGNESIUM HYDROXIDE 1200 MG/15ML
LIQUID ORAL AS NEEDED
Status: DISCONTINUED | OUTPATIENT
Start: 2021-12-22 | End: 2021-12-22 | Stop reason: HOSPADM

## 2021-12-22 RX ORDER — DIPHENHYDRAMINE HYDROCHLORIDE 50 MG/ML
12.5 INJECTION INTRAMUSCULAR; INTRAVENOUS ONCE
Status: COMPLETED | OUTPATIENT
Start: 2021-12-22 | End: 2021-12-22

## 2021-12-22 RX ORDER — ONDANSETRON 2 MG/ML
4 INJECTION INTRAMUSCULAR; INTRAVENOUS EVERY 6 HOURS PRN
Status: DISCONTINUED | OUTPATIENT
Start: 2021-12-22 | End: 2021-12-23 | Stop reason: HOSPADM

## 2021-12-22 RX ORDER — FAMOTIDINE 10 MG/ML
20 INJECTION, SOLUTION INTRAVENOUS
Status: COMPLETED | OUTPATIENT
Start: 2021-12-22 | End: 2021-12-22

## 2021-12-22 RX ORDER — DEXAMETHASONE SODIUM PHOSPHATE 4 MG/ML
8 INJECTION, SOLUTION INTRA-ARTICULAR; INTRALESIONAL; INTRAMUSCULAR; INTRAVENOUS; SOFT TISSUE ONCE
Status: COMPLETED | OUTPATIENT
Start: 2021-12-22 | End: 2021-12-22

## 2021-12-22 RX ORDER — PREGABALIN 75 MG/1
150 CAPSULE ORAL ONCE
Status: COMPLETED | OUTPATIENT
Start: 2021-12-22 | End: 2021-12-22

## 2021-12-22 RX ORDER — BUPIVACAINE HYDROCHLORIDE 5 MG/ML
INJECTION, SOLUTION PERINEURAL
Status: COMPLETED | OUTPATIENT
Start: 2021-12-22 | End: 2021-12-22

## 2021-12-22 RX ORDER — FENTANYL CITRATE 50 UG/ML
50 INJECTION, SOLUTION INTRAMUSCULAR; INTRAVENOUS
Status: DISCONTINUED | OUTPATIENT
Start: 2021-12-22 | End: 2021-12-22 | Stop reason: HOSPADM

## 2021-12-22 RX ORDER — ACETAMINOPHEN 325 MG/1
950 TABLET ORAL 3 TIMES DAILY
Status: DISCONTINUED | OUTPATIENT
Start: 2021-12-22 | End: 2021-12-23 | Stop reason: HOSPADM

## 2021-12-22 RX ORDER — SODIUM CHLORIDE, SODIUM LACTATE, POTASSIUM CHLORIDE, CALCIUM CHLORIDE 600; 310; 30; 20 MG/100ML; MG/100ML; MG/100ML; MG/100ML
9 INJECTION, SOLUTION INTRAVENOUS CONTINUOUS
Status: DISCONTINUED | OUTPATIENT
Start: 2021-12-22 | End: 2021-12-22

## 2021-12-22 RX ORDER — SODIUM CHLORIDE 0.9 % (FLUSH) 0.9 %
10 SYRINGE (ML) INJECTION EVERY 12 HOURS SCHEDULED
Status: DISCONTINUED | OUTPATIENT
Start: 2021-12-22 | End: 2021-12-22 | Stop reason: HOSPADM

## 2021-12-22 RX ORDER — OXYCODONE HYDROCHLORIDE 5 MG/1
10 TABLET ORAL EVERY 4 HOURS PRN
Status: DISCONTINUED | OUTPATIENT
Start: 2021-12-22 | End: 2021-12-23 | Stop reason: HOSPADM

## 2021-12-22 RX ORDER — CEFAZOLIN SODIUM IN 0.9 % NACL 3 G/100 ML
3 INTRAVENOUS SOLUTION, PIGGYBACK (ML) INTRAVENOUS ONCE
Status: DISCONTINUED | OUTPATIENT
Start: 2021-12-22 | End: 2021-12-22

## 2021-12-22 RX ORDER — LIDOCAINE HYDROCHLORIDE 20 MG/ML
INJECTION, SOLUTION INFILTRATION; PERINEURAL AS NEEDED
Status: DISCONTINUED | OUTPATIENT
Start: 2021-12-22 | End: 2021-12-22 | Stop reason: SURG

## 2021-12-22 RX ORDER — DEXMEDETOMIDINE HYDROCHLORIDE 100 UG/ML
INJECTION, SOLUTION INTRAVENOUS AS NEEDED
Status: DISCONTINUED | OUTPATIENT
Start: 2021-12-22 | End: 2021-12-22 | Stop reason: SURG

## 2021-12-22 RX ORDER — ASPIRIN 325 MG
325 TABLET ORAL EVERY 12 HOURS SCHEDULED
Status: DISCONTINUED | OUTPATIENT
Start: 2021-12-23 | End: 2021-12-23 | Stop reason: HOSPADM

## 2021-12-22 RX ORDER — MELOXICAM 7.5 MG/1
15 TABLET ORAL DAILY
Status: DISCONTINUED | OUTPATIENT
Start: 2021-12-22 | End: 2021-12-23 | Stop reason: HOSPADM

## 2021-12-22 RX ORDER — ONDANSETRON 2 MG/ML
4 INJECTION INTRAMUSCULAR; INTRAVENOUS ONCE AS NEEDED
Status: DISCONTINUED | OUTPATIENT
Start: 2021-12-22 | End: 2021-12-22 | Stop reason: HOSPADM

## 2021-12-22 RX ORDER — KETAMINE HYDROCHLORIDE 10 MG/ML
INJECTION INTRAMUSCULAR; INTRAVENOUS AS NEEDED
Status: DISCONTINUED | OUTPATIENT
Start: 2021-12-22 | End: 2021-12-22 | Stop reason: SURG

## 2021-12-22 RX ORDER — PANTOPRAZOLE SODIUM 40 MG/1
40 TABLET, DELAYED RELEASE ORAL EVERY MORNING
Status: DISCONTINUED | OUTPATIENT
Start: 2021-12-23 | End: 2021-12-23 | Stop reason: HOSPADM

## 2021-12-22 RX ADMIN — MELOXICAM 15 MG: 7.5 TABLET ORAL at 11:35

## 2021-12-22 RX ADMIN — MORPHINE SULFATE 6 MG: 10 INJECTION, SOLUTION INTRAMUSCULAR; INTRAVENOUS at 18:18

## 2021-12-22 RX ADMIN — BUPIVACAINE HYDROCHLORIDE 8 ML/HR: 5 INJECTION, SOLUTION EPIDURAL; INTRACAUDAL at 16:55

## 2021-12-22 RX ADMIN — DEXMEDETOMIDINE 20 MCG: 100 INJECTION, SOLUTION, CONCENTRATE INTRAVENOUS at 12:46

## 2021-12-22 RX ADMIN — KETAMINE HYDROCHLORIDE 30 MG: 10 INJECTION, SOLUTION INTRAMUSCULAR; INTRAVENOUS at 13:10

## 2021-12-22 RX ADMIN — SODIUM CHLORIDE 3 G: 9 INJECTION, SOLUTION INTRAVENOUS at 13:10

## 2021-12-22 RX ADMIN — PREGABALIN 75 MG: 75 CAPSULE ORAL at 20:26

## 2021-12-22 RX ADMIN — PREGABALIN 150 MG: 75 CAPSULE ORAL at 11:35

## 2021-12-22 RX ADMIN — FAMOTIDINE 20 MG: 10 INJECTION, SOLUTION INTRAVENOUS at 12:25

## 2021-12-22 RX ADMIN — SODIUM CHLORIDE, POTASSIUM CHLORIDE, SODIUM LACTATE AND CALCIUM CHLORIDE 9 ML/HR: 600; 310; 30; 20 INJECTION, SOLUTION INTRAVENOUS at 11:35

## 2021-12-22 RX ADMIN — ACETAMINOPHEN 975 MG: 325 TABLET ORAL at 20:26

## 2021-12-22 RX ADMIN — SODIUM CHLORIDE 1000 MG: 9 INJECTION, SOLUTION INTRAVENOUS at 15:22

## 2021-12-22 RX ADMIN — SODIUM CHLORIDE 3 G: 9 INJECTION, SOLUTION INTRAVENOUS at 20:25

## 2021-12-22 RX ADMIN — ONDANSETRON 4 MG: 2 INJECTION INTRAMUSCULAR; INTRAVENOUS at 12:25

## 2021-12-22 RX ADMIN — BUPIVACAINE HYDROCHLORIDE 20 ML: 2.5 INJECTION, SOLUTION EPIDURAL; INFILTRATION; INTRACAUDAL; PERINEURAL at 12:35

## 2021-12-22 RX ADMIN — MIDAZOLAM HYDROCHLORIDE 1 MG: 1 INJECTION, SOLUTION INTRAMUSCULAR; INTRAVENOUS at 12:25

## 2021-12-22 RX ADMIN — DEXAMETHASONE SODIUM PHOSPHATE 8 MG: 4 INJECTION, SOLUTION INTRAMUSCULAR; INTRAVENOUS at 12:25

## 2021-12-22 RX ADMIN — MIDAZOLAM HYDROCHLORIDE 1 MG: 1 INJECTION, SOLUTION INTRAMUSCULAR; INTRAVENOUS at 12:35

## 2021-12-22 RX ADMIN — MORPHINE SULFATE 6 MG: 10 INJECTION, SOLUTION INTRAMUSCULAR; INTRAVENOUS at 21:34

## 2021-12-22 RX ADMIN — BUPIVACAINE HYDROCHLORIDE 2 ML: 5 INJECTION, SOLUTION PERINEURAL at 13:01

## 2021-12-22 RX ADMIN — MELOXICAM 15 MG: 7.5 TABLET ORAL at 18:33

## 2021-12-22 RX ADMIN — DIPHENHYDRAMINE HYDROCHLORIDE 12.5 MG: 50 INJECTION, SOLUTION INTRAMUSCULAR; INTRAVENOUS at 12:25

## 2021-12-22 RX ADMIN — DEXMEDETOMIDINE 20 MCG: 100 INJECTION, SOLUTION, CONCENTRATE INTRAVENOUS at 12:35

## 2021-12-22 RX ADMIN — SODIUM CHLORIDE 1000 MG: 9 INJECTION, SOLUTION INTRAVENOUS at 13:14

## 2021-12-22 RX ADMIN — OXYCODONE HYDROCHLORIDE 10 MG: 5 TABLET ORAL at 23:01

## 2021-12-22 RX ADMIN — PROPOFOL 100 MCG/KG/MIN: 10 INJECTION, EMULSION INTRAVENOUS at 13:10

## 2021-12-22 RX ADMIN — ACETAMINOPHEN 1000 MG: 500 TABLET, FILM COATED ORAL at 11:35

## 2021-12-22 RX ADMIN — BUPIVACAINE HYDROCHLORIDE 10 ML: 2.5 INJECTION, SOLUTION EPIDURAL; INFILTRATION; INTRACAUDAL at 12:46

## 2021-12-22 RX ADMIN — LIDOCAINE HYDROCHLORIDE 50 MG: 20 INJECTION, SOLUTION INFILTRATION; PERINEURAL at 13:10

## 2021-12-22 NOTE — ANESTHESIA PROCEDURE NOTES
Spinal Block      Patient reassessed immediately prior to procedure    Patient location during procedure: pre-op  Start Time: 12/22/2021 12:55 PM  Stop Time: 12/22/2021 1:01 PM  Indication:at surgeon's request and procedure for pain  Performed By  CRNA: Rupal Parker CRNA  Preanesthetic Checklist  Completed: patient identified, IV checked, site marked, risks and benefits discussed, surgical consent, monitors and equipment checked, pre-op evaluation and timeout performed  Spinal Block Prep:  Patient Position:sitting  Sterile Tech:cap, gloves, mask and sterile barriers  Prep:Betadine  Patient Monitoring:blood pressure monitoring, continuous pulse oximetry and EKG  Spinal Block Procedure  Approach:midline  Guidance:landmark technique and palpation technique  Location:L4-L5  Needle Type:Sprotte  Needle Gauge:25 G  Placement of Spinal needle event:cerebrospinal fluid aspirated  Paresthesia: no  Fluid Appearance:clear  Medications: bupivacaine (MARCAINE) injection 0.5%, 2 mL  Med Administered at 12/22/2021 1:01 PM   Post Assessment  Complications no  Additional Notes  Lidocaine 2% 1ml skin infiltration

## 2021-12-22 NOTE — ANESTHESIA PREPROCEDURE EVALUATION
Anesthesia Evaluation     Patient summary reviewed and Nursing notes reviewed   no history of anesthetic complications:  NPO Solid Status: > 8 hours  NPO Liquid Status: > 2 hours           Airway   Mallampati: II  TM distance: >3 FB  Neck ROM: full  No difficulty expected  Dental      Pulmonary     breath sounds clear to auscultation  (+) a smoker Former,   Cardiovascular - negative cardio ROS  Exercise tolerance: good (4-7 METS)    Rhythm: regular  Rate: normal        Neuro/Psych- negative ROS  GI/Hepatic/Renal/Endo    (+)  GERD well controlled,      Musculoskeletal     Abdominal   (+) obese,    Substance History   (+) alcohol use (1-2 drinks per week),      OB/GYN          Other   arthritis (right knee ),                      Anesthesia Plan    ASA 2     MAC, regional and spinal     intravenous induction     Anesthetic plan, all risks, benefits, and alternatives have been provided, discussed and informed consent has been obtained with: patient.  Use of blood products discussed with patient  Consented to blood products.

## 2021-12-22 NOTE — ANESTHESIA PROCEDURE NOTES
Peripheral Block      Patient reassessed immediately prior to procedure    Patient location during procedure: pre-op  Start time: 12/22/2021 12:39 PM  Stop time: 12/22/2021 1:42 PM  Reason for block: at surgeon's request and post-op pain management  Performed by  CRNA: Rupal Parker CRNA  Preanesthetic Checklist  Completed: patient identified, IV checked, site marked, risks and benefits discussed, surgical consent, monitors and equipment checked, pre-op evaluation and timeout performed  Prep:  Pt Position: supine  Sterile barriers:cap, gloves, mask and washed/disinfected hands  Prep: ChloraPrep  Patient monitoring: blood pressure monitoring, continuous pulse oximetry and EKG  Procedure    Sedation: yes  Performed under: local infiltration  Guidance:ultrasound guided  Images:still images obtained, printed/placed on chart    Laterality:right  Block Type:adductor canal block  Injection Technique:catheter  Needle Type:echogenic  Resistance on Injection: none    Medications Used: bupivacaine PF (MARCAINE) injection 0.25%, 10 mL  Med administered at 12/22/2021 12:46 PM      Medications  Comment:precedex 20mcg added to block    Post Assessment  Injection Assessment: negative aspiration for heme, no paresthesia on injection and incremental injection  Patient Tolerance:comfortable throughout block  Complications:no  Additional Notes  Lidocaine 2% 1ml skin infiltration

## 2021-12-22 NOTE — ANESTHESIA POSTPROCEDURE EVALUATION
Patient: Mark Ballard    Procedure Summary     Date: 12/22/21 Room / Location:  [facility] /  [facility]    Anesthesia Start: 1306 Anesthesia Stop: 1607    Procedure: PArtial  KNEE ARTHROPLASTY AND ALL ASSOCIATED PROCEDURES (Right Knee) Diagnosis:       Complex tear of medial meniscus of right knee as current injury, initial encounter      Primary osteoarthritis of right knee      (Complex tear of medial meniscus of right knee as current injury, initial encounter [S83.231A])      (Primary osteoarthritis of right knee [M17.11])    Surgeons: Job Sanchez MD Provider: Ana Nolasco CRNA    Anesthesia Type: MAC, regional, spinal ASA Status: 2          Anesthesia Type: MAC, regional, spinal    Vitals  Vitals Value Taken Time   /83 12/22/21 1635   Temp 97.5 °F (36.4 °C) 12/22/21 1606   Pulse 69 12/22/21 1639   Resp 17 12/22/21 1630   SpO2 94 % 12/22/21 1639   Vitals shown include unvalidated device data.        Post Anesthesia Care and Evaluation    Patient location during evaluation: PHASE II  Patient participation: complete - patient participated  Level of consciousness: awake  Pain score: 0  Pain management: adequate  Airway patency: patent  Anesthetic complications: No anesthetic complications  PONV Status: none  Cardiovascular status: acceptable  Respiratory status: acceptable  Hydration status: acceptable

## 2021-12-22 NOTE — ANESTHESIA PROCEDURE NOTES
Peripheral Block      Patient reassessed immediately prior to procedure    Patient location during procedure: pre-op  Start time: 12/22/2021 12:34 PM  Stop time: 12/22/2021 12:35 PM  Reason for block: at surgeon's request and post-op pain management  Performed by  CRNA: Rupal Parker CRNA  Preanesthetic Checklist  Completed: patient identified, IV checked, site marked, risks and benefits discussed, surgical consent, monitors and equipment checked, pre-op evaluation and timeout performed  Prep:  Pt Position: supine  Sterile barriers:cap, gloves, mask and washed/disinfected hands  Prep: ChloraPrep  Patient monitoring: blood pressure monitoring, continuous pulse oximetry and EKG  Procedure    Sedation: yes  Performed under: local infiltration  Guidance:ultrasound guided  Images:still images obtained, printed/placed on chart    Laterality:right  Block Type:iPack  Injection Technique:single-shot  Needle Type:echogenic  Needle Gauge:21 G  Resistance on Injection: none    Medications Used: bupivacaine PF (MARCAINE) injection 0.25%, 20 mL  Med administered at 12/22/2021 12:35 PM      Medications  Comment:precedex 20mcg added to block    Post Assessment  Injection Assessment: negative aspiration for heme, no paresthesia on injection and incremental injection  Patient Tolerance:comfortable throughout block  Complications:no  Additional Notes  Lidocaine 2% 1ml skin infiltration

## 2021-12-23 VITALS
SYSTOLIC BLOOD PRESSURE: 131 MMHG | TEMPERATURE: 97.6 F | RESPIRATION RATE: 18 BRPM | OXYGEN SATURATION: 96 % | DIASTOLIC BLOOD PRESSURE: 88 MMHG | WEIGHT: 298.8 LBS | HEART RATE: 77 BPM | BODY MASS INDEX: 39.42 KG/M2

## 2021-12-23 LAB
ANION GAP SERPL CALCULATED.3IONS-SCNC: 9.4 MMOL/L (ref 5–15)
BASOPHILS # BLD AUTO: 0.02 10*3/MM3 (ref 0–0.2)
BASOPHILS NFR BLD AUTO: 0.2 % (ref 0–1.5)
BUN SERPL-MCNC: 13 MG/DL (ref 6–20)
BUN/CREAT SERPL: 15.5 (ref 7–25)
CALCIUM SPEC-SCNC: 8.2 MG/DL (ref 8.6–10.5)
CHLORIDE SERPL-SCNC: 103 MMOL/L (ref 98–107)
CO2 SERPL-SCNC: 23.6 MMOL/L (ref 22–29)
CREAT SERPL-MCNC: 0.84 MG/DL (ref 0.76–1.27)
DEPRECATED RDW RBC AUTO: 38.8 FL (ref 37–54)
EOSINOPHIL # BLD AUTO: 0.01 10*3/MM3 (ref 0–0.4)
EOSINOPHIL NFR BLD AUTO: 0.1 % (ref 0.3–6.2)
ERYTHROCYTE [DISTWIDTH] IN BLOOD BY AUTOMATED COUNT: 11.9 % (ref 12.3–15.4)
GFR SERPL CREATININE-BSD FRML MDRD: 101 ML/MIN/1.73
GLUCOSE SERPL-MCNC: 142 MG/DL (ref 65–99)
HCT VFR BLD AUTO: 40.2 % (ref 37.5–51)
HGB BLD-MCNC: 12.9 G/DL (ref 13–17.7)
IMM GRANULOCYTES # BLD AUTO: 0.04 10*3/MM3 (ref 0–0.05)
IMM GRANULOCYTES NFR BLD AUTO: 0.3 % (ref 0–0.5)
LYMPHOCYTES # BLD AUTO: 1.6 10*3/MM3 (ref 0.7–3.1)
LYMPHOCYTES NFR BLD AUTO: 13.4 % (ref 19.6–45.3)
MCH RBC QN AUTO: 28.7 PG (ref 26.6–33)
MCHC RBC AUTO-ENTMCNC: 32.1 G/DL (ref 31.5–35.7)
MCV RBC AUTO: 89.3 FL (ref 79–97)
MONOCYTES # BLD AUTO: 0.82 10*3/MM3 (ref 0.1–0.9)
MONOCYTES NFR BLD AUTO: 6.9 % (ref 5–12)
NEUTROPHILS NFR BLD AUTO: 79.1 % (ref 42.7–76)
NEUTROPHILS NFR BLD AUTO: 9.48 10*3/MM3 (ref 1.7–7)
NRBC BLD AUTO-RTO: 0 /100 WBC (ref 0–0.2)
PLATELET # BLD AUTO: 335 10*3/MM3 (ref 140–450)
PMV BLD AUTO: 10 FL (ref 6–12)
POTASSIUM SERPL-SCNC: 4.2 MMOL/L (ref 3.5–5.2)
RBC # BLD AUTO: 4.5 10*6/MM3 (ref 4.14–5.8)
SODIUM SERPL-SCNC: 136 MMOL/L (ref 136–145)
WBC NRBC COR # BLD: 11.97 10*3/MM3 (ref 3.4–10.8)

## 2021-12-23 PROCEDURE — G0378 HOSPITAL OBSERVATION PER HR: HCPCS

## 2021-12-23 PROCEDURE — 0 CEFAZOLIN PER 500 MG: Performed by: ORTHOPAEDIC SURGERY

## 2021-12-23 PROCEDURE — 85025 COMPLETE CBC W/AUTO DIFF WBC: CPT | Performed by: ORTHOPAEDIC SURGERY

## 2021-12-23 PROCEDURE — 97161 PT EVAL LOW COMPLEX 20 MIN: CPT

## 2021-12-23 PROCEDURE — 97116 GAIT TRAINING THERAPY: CPT

## 2021-12-23 PROCEDURE — 97165 OT EVAL LOW COMPLEX 30 MIN: CPT

## 2021-12-23 PROCEDURE — 80048 BASIC METABOLIC PNL TOTAL CA: CPT | Performed by: ORTHOPAEDIC SURGERY

## 2021-12-23 RX ORDER — PREGABALIN 75 MG/1
75 CAPSULE ORAL EVERY 12 HOURS SCHEDULED
Qty: 60 CAPSULE | Refills: 0 | Status: SHIPPED | OUTPATIENT
Start: 2021-12-23 | End: 2022-01-26

## 2021-12-23 RX ORDER — PREDNISONE 1 MG/1
5 TABLET ORAL DAILY
Qty: 21 TABLET | Refills: 0 | Status: SHIPPED | OUTPATIENT
Start: 2021-12-23 | End: 2022-01-17

## 2021-12-23 RX ORDER — OXYCODONE HYDROCHLORIDE AND ACETAMINOPHEN 5; 325 MG/1; MG/1
TABLET ORAL
Qty: 36 TABLET | Refills: 0 | Status: SHIPPED | OUTPATIENT
Start: 2021-12-23 | End: 2022-01-03

## 2021-12-23 RX ORDER — MELOXICAM 15 MG/1
15 TABLET ORAL DAILY
Qty: 30 TABLET | Refills: 0 | Status: SHIPPED | OUTPATIENT
Start: 2021-12-23 | End: 2022-01-14 | Stop reason: SDUPTHER

## 2021-12-23 RX ORDER — ONDANSETRON 4 MG/1
4 TABLET, FILM COATED ORAL EVERY 8 HOURS PRN
Qty: 20 TABLET | Refills: 0 | Status: SHIPPED | OUTPATIENT
Start: 2021-12-23 | End: 2022-01-26

## 2021-12-23 RX ORDER — ASPIRIN 325 MG
TABLET ORAL
Qty: 42 TABLET | Refills: 0 | Status: SHIPPED | OUTPATIENT
Start: 2021-12-23 | End: 2022-01-26

## 2021-12-23 RX ORDER — FOLIC ACID 0.8 MG
500 TABLET ORAL
Qty: 30 EACH | Refills: 0 | Status: SHIPPED | OUTPATIENT
Start: 2021-12-23 | End: 2022-01-26

## 2021-12-23 RX ADMIN — OXYCODONE HYDROCHLORIDE 10 MG: 5 TABLET ORAL at 08:33

## 2021-12-23 RX ADMIN — ACETAMINOPHEN 975 MG: 325 TABLET ORAL at 08:34

## 2021-12-23 RX ADMIN — OXYCODONE HYDROCHLORIDE 5 MG: 5 TABLET ORAL at 03:55

## 2021-12-23 RX ADMIN — ASPIRIN 325 MG ORAL TABLET 325 MG: 325 PILL ORAL at 08:33

## 2021-12-23 RX ADMIN — PANTOPRAZOLE SODIUM 40 MG: 40 TABLET, DELAYED RELEASE ORAL at 05:15

## 2021-12-23 RX ADMIN — MELOXICAM 15 MG: 7.5 TABLET ORAL at 08:33

## 2021-12-23 RX ADMIN — SODIUM CHLORIDE 3 G: 9 INJECTION, SOLUTION INTRAVENOUS at 04:09

## 2021-12-23 RX ADMIN — PREGABALIN 75 MG: 75 CAPSULE ORAL at 08:33

## 2021-12-24 ENCOUNTER — READMISSION MANAGEMENT (OUTPATIENT)
Dept: CALL CENTER | Facility: HOSPITAL | Age: 41
End: 2021-12-24

## 2021-12-24 NOTE — OUTREACH NOTE
Prep Survey      Responses   [facility] facility patient discharged from? [place]   Is LACE score < 7 ? No   Emergency Room discharge w/ pulse ox? No   Eligibility Readm Mgmt   Discharge diagnosis  KS PLASTY KNEE,MED OR LAT COMPARTMT [id number] (PArtial  KNEE ARTHROPLASTY AND ALL ASSOCIATED PROCEDURES), RIGHT    Does the patient have one of the following disease processes/diagnoses(primary or secondary)? Total Joint Replacement   Does the patient have Home health ordered? No   Is there a DME ordered? Yes   What DME was ordered? [facility]   Prep survey completed? Yes          Ting Oro, RN

## 2021-12-26 DIAGNOSIS — Z96.651 S/P RIGHT UNICOMPARTMENTAL KNEE REPLACEMENT: Primary | ICD-10-CM

## 2021-12-26 RX ORDER — OXYCODONE HYDROCHLORIDE AND ACETAMINOPHEN 5; 325 MG/1; MG/1
1 TABLET ORAL EVERY 4 HOURS PRN
Qty: 42 TABLET | Refills: 0 | Status: SHIPPED | OUTPATIENT
Start: 2021-12-26 | End: 2022-01-03

## 2021-12-27 ENCOUNTER — TELEPHONE (OUTPATIENT)
Dept: ORTHOPEDIC SURGERY | Facility: CLINIC | Age: 41
End: 2021-12-27

## 2021-12-27 NOTE — TELEPHONE ENCOUNTER
Caller: LEANA CHAIDEZ   Relationship to Patient: WIFE- [employer]     Phone Number: [phone]  Reason for Call: PATIENTS WIFE CALLING STATING THAT PATIENT WAS SUPPOSED TO HAVE PHYSICAL THERAPY TODAY FOR THE FIRST TIME BUT THEIR THERAPIST CANCELED DUE TO ILLNESS THEY NEED TO MAKE SURE ITS OK THAT HE DOESN'T GO TO PT UNTIL Wednesday

## 2021-12-28 ENCOUNTER — READMISSION MANAGEMENT (OUTPATIENT)
Dept: CALL CENTER | Facility: HOSPITAL | Age: 41
End: 2021-12-28

## 2021-12-28 NOTE — OUTREACH NOTE
Total Joint Week 1 Survey      Responses   [facility] patient discharged from? [place]   Does the patient have one of the following disease processes/diagnoses(primary or secondary)? Total Joint Replacement   Joint surgery performed? Knee   Week 1 attempt successful? Yes   Call start time 1704   Call end time 1717   Has the patient been back in either the hospital or Emergency Department since discharge? No   Discharge diagnosis Partial knee arthroplasty    Is patient permission given to speak with other caregiver? Yes   List who call center can speak with Ale Elio, spouse   Person spoke with today (if not patient) and relationship spouse   Does the patient have all medications related to this admission filled (includes all antibiotics, pain medications, etc.) Yes   Is the patient taking all medications as directed (includes completed medication regime)? Yes   Is the patient able to teach back alternate methods of pain control? Ice,  Knee-elevation/no pillow under knee   Does the patient have a follow up appointment with their surgeon? Yes  [1/5/22]   Has the patient kept scheduled appointments due by today? N/A   Has home health visited the patient within 72 hours of discharge? N/A   Has all DME been delivered? Yes  [Wife states [facility] that was gotten to use over toilet, does not fit and not able to use. Wife to contact [facility] and check if can be returned. ]   When is the first therapy visit scheduled (PO Day) including how many days per week  Outpt PT scheduled for tomorrow 12/29/21   Has the patient began therapy sessions (either in the home or as an out patient)? No   Does the patient have a wound vac in place? N/A   Has the patient fallen since discharge? No   Did the patient receive a copy of their discharge instructions? Yes   Nursing interventions Reviewed instructions with patient   What is the patient's perception of their functional status since discharge? Improving   Is the patient able to  teach back signs and symptoms of infection? Temp >100.4 for 24h or longer,  Incisional drainage,  Blisters around incision,  Increased swelling or redness around incision (not associated with surgical edema)   Is the patient able to teach back how to prevent infection? Check incision daily   Is the patient able to teach back signs and symptoms of DVT? Redness in calf,  Swelling in calf,  Severe pain in calf,  Area hot to touch   Is the patient able to teach back home safety measures? Ability to shower,  Accessibility to necessary areas in home,  Modifications with ADLs such as dressing, cooking, toileting   Did the patient implement home safety suggestions from pre-surgery classes if attended? Yes   If the patient is a current smoker, are they able to teach back resources for cessation? Not a smoker   Is the patient/caregiver able to teach back the hierarchy of who to call/visit for symptoms/problems? PCP, Specialist, Home health nurse, Urgent Care, ED, 911 Yes   Week 1 call completed? Yes          Ro Clinton RN

## 2022-01-03 ENCOUNTER — OFFICE VISIT (OUTPATIENT)
Dept: ORTHOPEDIC SURGERY | Facility: CLINIC | Age: 42
End: 2022-01-03

## 2022-01-03 ENCOUNTER — READMISSION MANAGEMENT (OUTPATIENT)
Dept: CALL CENTER | Facility: HOSPITAL | Age: 42
End: 2022-01-03

## 2022-01-03 VITALS — HEIGHT: 73 IN | BODY MASS INDEX: 39.49 KG/M2 | WEIGHT: 298 LBS

## 2022-01-03 DIAGNOSIS — Z96.651 STATUS POST RIGHT PARTIAL KNEE REPLACEMENT: ICD-10-CM

## 2022-01-03 DIAGNOSIS — Z96.651 S/P RIGHT UNICOMPARTMENTAL KNEE REPLACEMENT: Primary | ICD-10-CM

## 2022-01-03 PROCEDURE — 99024 POSTOP FOLLOW-UP VISIT: CPT | Performed by: NURSE PRACTITIONER

## 2022-01-03 RX ORDER — DOXYCYCLINE HYCLATE 100 MG/1
100 CAPSULE ORAL 2 TIMES DAILY
Qty: 28 CAPSULE | Refills: 0 | Status: SHIPPED | OUTPATIENT
Start: 2022-01-03 | End: 2022-01-11

## 2022-01-03 RX ORDER — OXYCODONE HYDROCHLORIDE AND ACETAMINOPHEN 5; 325 MG/1; MG/1
TABLET ORAL
Qty: 36 TABLET | Refills: 0 | Status: SHIPPED | OUTPATIENT
Start: 2022-01-03 | End: 2022-01-11 | Stop reason: SDUPTHER

## 2022-01-03 NOTE — PROGRESS NOTES
CC: s/p right partial knee arthroplasty, medial compartment, DOS 12/22/2021  Interval History: Mark Ballard returns for 2 week postoperative visit.  He is doing well. Pain is controlled with pain medication and is  improving. He denies any wound problem, fevers, or chills. Patient is continuing to work with outpatient PT. Ambulating with walker. Continuing DVT prophylaxis with use of aspirin.     Physical Examination:    Right knee was examined   Incision clean dry and intact, along the lateral aspect of the knee proximal to the incision clear drainage appears to be contact dermatitis related no evidence of any  infection of the incision    ROM 0-106,  4/5 strength   Stable to varus and valgus stress   Flex/extend ankle and toes   Positive sensation right foot   No calf pain, negative Homans sign bilaterally    Assessment/Plan:  Mark Ballard is recovering from surgery as expected.  We will continue outpatient therapy for range of motion, strengthening, and gait normalization.  Will reevaluate at post-op visit previously scheduled. Patient had all question answered today. Will continue DVT prophylaxis for an additional 2 weeks. Discussed with patient to avoid immersing incision for 4 weeks total after surgery.     Medications:  New Medications Ordered This Visit   Medications   • doxycycline (VIBRAMYCIN) 100 MG capsule     Sig: Take 1 capsule by mouth 2 (Two) Times a Day. Pharmacist to change salts per insurance preference     Dispense:  28 capsule     Refill:  0   • oxyCODONE-acetaminophen (PERCOCET) 5-325 MG per tablet     Sig: Take 1-2 tablets by mouth every 4-6 hours if needed for moderate-severe pain.     Dispense:  36 tablet     Refill:  0       ELIN Nova

## 2022-01-03 NOTE — OUTREACH NOTE
Total Joint Week 2 Survey      Responses   [facility] patient discharged from? [place]   Does the patient have one of the following disease processes/diagnoses(primary or secondary)? Total Joint Replacement   Joint surgery performed? Knee   Week 2 attempt successful? Yes   Call start time 1739   Call end time 1744   Has the patient been back in either the hospital or Emergency Department since discharge? No   Discharge diagnosis Partial knee arthroplasty    Is patient permission given to speak with other caregiver? Yes   List who call center can speak with Ale Ballard, spouse   Person spoke with today (if not patient) and relationship patient   Does the patient have all medications related to this admission filled (includes all antibiotics, pain medications, etc.) Yes   Is the patient taking all medications as directed (includes completed medication regime)? Yes   Is the patient able to teach back alternate methods of pain control? Ice,  Knee-elevation/no pillow under knee   Medication comments Patient now on doxycycline starting today.    Does the patient have a follow up appointment with their surgeon? Yes  [1/5/22]   Has the patient kept scheduled appointments due by today? Yes  [Patient saw ortho NP today. ]   Has home health visited the patient within 72 hours of discharge? N/A  [Patient going to outpt PT]   Has the patient began therapy sessions (either in the home or as an out patient)? Yes   Does the patient have a wound vac in place? No   Has the patient fallen since discharge? No   Did the patient receive a copy of their discharge instructions? Yes   Nursing interventions Reviewed instructions with patient   What is the patient's perception of their functional status since discharge? Improving  [Patient reports that he is overall improving, but has had issue and seen today due to being allergic to the dermabond. Patient has small amount of clear/pink wound drainage. ]   Is the patient able to  teach back signs and symptoms of infection? Temp >100.4 for 24h or longer,  Incisional drainage,  Blisters around incision,  Increased swelling or redness around incision (not associated with surgical edema)   Is the patient able to teach back how to prevent infection? Check incision daily   Is the patient able to teach back home safety measures? Ability to shower,  Accessibility to necessary areas in home,  Modifications with ADLs such as dressing, cooking, toileting   Did the patient implement home safety suggestions from pre-surgery classes if attended? Yes   If the patient is a current smoker, are they able to teach back resources for cessation? Not a smoker   Is the patient/caregiver able to teach back the hierarchy of who to call/visit for symptoms/problems? PCP, Specialist, Home health nurse, Urgent Care, ED, 911 Yes   Week 2 call completed? Yes          Ro Clinton RN

## 2022-01-04 NOTE — PROGRESS NOTES
CC: s/p right partial knee arthroplasty medial compartment, DOS 12/22/2021  Interval History: Mark Ballard returns for 2 week postoperative visit.  He is doing well. Pain is controlled with pain medication and is  improving.  Was seen 2 days ago started on doxycycline which she has been taking.  Dermabond dressing was removed 2 days ago replaced with Steri-Strips.  Has noted some bleeding down at the most distal aspect of the incision denies any drainage from the incision itself but is noticing some vesicles along the medial aspect of the incision with clear drainage.  Erythema does seem to be improving with the doxycycline.  Denies fevers, or chills. Patient is continuing to work with outpatient PT. Ambulating with cane short distances, continues with walker for long distances. Continuing DVT prophylaxis with use of aspirin.     Physical Examination:    Right knee was examined   Incision clean dry and intact, along the lateral aspect of the knee proximal to the incision clear drainage appears to be contact dermatitis related no evidence of any infection of the incision    ROM 0-106, 4/5 strength   Stable to varus and valgus stress   Flex/extend ankle and toes   Positive sensation right foot   No calf pain, negative Homans sign bilaterally    Assessment/Plan:  Mark Ballard is recovering from surgery as expected.  We will continue outpatient therapy for range of motion, strengthening, and gait normalization.    Will plan to see him back in clinic in 1 week to reevaluate the incision.  May discuss return to work at that visit.  Patient had all question answered today. Will continue DVT prophylaxis for an additional 2 weeks. Discussed with patient to avoid immersing incision for 4 weeks total after surgery.     Medications:  No orders of the defined types were placed in this encounter.      ELIN Nova

## 2022-01-05 ENCOUNTER — OFFICE VISIT (OUTPATIENT)
Dept: ORTHOPEDIC SURGERY | Facility: CLINIC | Age: 42
End: 2022-01-05

## 2022-01-05 VITALS — BODY MASS INDEX: 39.49 KG/M2 | HEIGHT: 73 IN | WEIGHT: 298 LBS

## 2022-01-05 DIAGNOSIS — Z96.651 STATUS POST RIGHT PARTIAL KNEE REPLACEMENT: Primary | ICD-10-CM

## 2022-01-05 PROCEDURE — 99024 POSTOP FOLLOW-UP VISIT: CPT | Performed by: NURSE PRACTITIONER

## 2022-01-11 ENCOUNTER — OFFICE VISIT (OUTPATIENT)
Dept: ORTHOPEDIC SURGERY | Facility: CLINIC | Age: 42
End: 2022-01-11

## 2022-01-11 VITALS — WEIGHT: 298 LBS | HEIGHT: 73 IN | BODY MASS INDEX: 39.49 KG/M2

## 2022-01-11 DIAGNOSIS — Z96.651 STATUS POST RIGHT PARTIAL KNEE REPLACEMENT: ICD-10-CM

## 2022-01-11 DIAGNOSIS — Z96.651 S/P RIGHT UNICOMPARTMENTAL KNEE REPLACEMENT: Primary | ICD-10-CM

## 2022-01-11 PROCEDURE — 99024 POSTOP FOLLOW-UP VISIT: CPT | Performed by: NURSE PRACTITIONER

## 2022-01-11 RX ORDER — OXYCODONE HYDROCHLORIDE AND ACETAMINOPHEN 5; 325 MG/1; MG/1
TABLET ORAL
Qty: 36 TABLET | Refills: 0 | Status: SHIPPED | OUTPATIENT
Start: 2022-01-11 | End: 2022-01-14 | Stop reason: SDUPTHER

## 2022-01-11 RX ORDER — DOXYCYCLINE HYCLATE 100 MG/1
100 CAPSULE ORAL 2 TIMES DAILY
Qty: 14 CAPSULE | Refills: 0 | Status: SHIPPED | OUTPATIENT
Start: 2022-01-11 | End: 2022-01-26

## 2022-01-11 NOTE — PROGRESS NOTES
CC: s/p right partial knee arthroplasty medial compartment, DOS 12/22/2021    Interval history: Mark Ballard Returns to clinic for follow-up of right lower extremity.  Continue with doxycycline twice daily tolerating well.  Denies any drainage from the incision.  He does have concerns about the dark tissue at the most inferior part of the incision denies any drainage present.  Also concerns about the blister that busted just medial to the distal aspect of the incision.  Has continued with physical therapy did gain 125 degrees of flexion.  He is experiencing pain at night when he is attempting to rest and is currently taking pain medication because of the pain.  Swelling well controlled at this time does note swelling throughout the day, increased swelling with activity.    Exam:  Right knee exam:  Contact dermatitis almost completely healed  Incision clean and dry intact for the most distal aspect of the incision which does have a red beefy wound bed does not appear infected, incision closed most superficial aspect of the skin remains open  Knee range of motion 0 degrees to 121 degrees, 4+ out of 5 strength stable to varus and valgus stress at 0 degrees and 30 degrees  Positive sensation all distributions right lower extremity  Negative calf tenderness, negative Homans' sign    Assessment: status post partial knee arthroplasty, medial compartment    Plan:  1.  Discussed plan of care with patient and spouse.  We will continue the doxycycline twice daily.  2.  Do recommend cleaning peroxide water mix to the most distal aspect of the incision with a Q-tip twice daily, cover with Tegaderm when showering.  We will keep him off work for the next week plan to see him back in clinic in 1 week to reevaluate decision and possibly discuss return to work at follow-up.  Encourage patient to decrease his activity over the next few days to see if this helps better control the pain he is experiencing at night.  Continue with  home strengthening exercises with physical therapy as instructed.  All questions answered.      New Medications Ordered This Visit   Medications   • doxycycline (VIBRAMYCIN) 100 MG capsule     Sig: Take 1 capsule by mouth 2 (Two) Times a Day. Pharm D to change salts per insurance preference     Dispense:  14 capsule     Refill:  0

## 2022-01-11 NOTE — PROGRESS NOTES
Returns to clinic for follow-up of right lower extremity. Continue with doxycycline twice daily tolerating well. Denies any drainage from the incision. He does have concerns about the dark tissue at the most inferior part of the incision denies any drainage present. Also concerns about the blister that busted just medial to the distal aspect of the incision. Has continued with physical therapy did gain 125 degrees of flexion. He is experiencing pain at night when he is attempting to rest and is currently taking pain medication because of the pain. Swelling well controlled at this time does note swelling throughout the day, increased swelling with activity.    Plan:  1. Discussed plan of care with patient and spouse. We will continue the doxycycline twice daily.  2. Do recommend cleaning peroxide water mix to the most distal aspect of the incision with a Q-tip twice daily, cover with Tegaderm when showering. We will keep him off work for the next week plan to see him back in clinic in 1 week to reevaluate decision and possibly discuss return to work at follow-up. Encourage patient to decrease his activity over the next few days to see if this helps better control the pain he is experiencing at night. Continue with home strengthening exercises with physical therapy as instructed.  All questions answered.

## 2022-01-14 DIAGNOSIS — Z96.651 STATUS POST RIGHT PARTIAL KNEE REPLACEMENT: ICD-10-CM

## 2022-01-14 RX ORDER — OXYCODONE HYDROCHLORIDE AND ACETAMINOPHEN 5; 325 MG/1; MG/1
TABLET ORAL
Qty: 36 TABLET | Refills: 0 | Status: SHIPPED | OUTPATIENT
Start: 2022-01-14 | End: 2022-01-26

## 2022-01-14 NOTE — TELEPHONE ENCOUNTER
Can you please re send they are having a very hard time filling it at [facility]- his insurance is only wanting to cover [facility]. I did attempt a PA.    [facility] canceled the old order from there system.    Previous RX pended for your approval.    s/p right partial knee arthroplasty medial compartment, DOS 12/22/2021

## 2022-01-17 ENCOUNTER — READMISSION MANAGEMENT (OUTPATIENT)
Dept: CALL CENTER | Facility: HOSPITAL | Age: 42
End: 2022-01-17

## 2022-01-17 ENCOUNTER — OFFICE VISIT (OUTPATIENT)
Dept: ORTHOPEDIC SURGERY | Facility: CLINIC | Age: 42
End: 2022-01-17

## 2022-01-17 VITALS — BODY MASS INDEX: 39.49 KG/M2 | WEIGHT: 298 LBS | HEIGHT: 73 IN

## 2022-01-17 DIAGNOSIS — Z96.651 STATUS POST RIGHT PARTIAL KNEE REPLACEMENT: Primary | ICD-10-CM

## 2022-01-17 DIAGNOSIS — Z96.651 S/P RIGHT UNICOMPARTMENTAL KNEE REPLACEMENT: ICD-10-CM

## 2022-01-17 PROCEDURE — 99024 POSTOP FOLLOW-UP VISIT: CPT | Performed by: NURSE PRACTITIONER

## 2022-01-17 NOTE — OUTREACH NOTE
Total Joint Month 1 Survey      Responses   [facility] patient discharged from? [place]   Does the patient have one of the following disease processes/diagnoses(primary or secondary)? Total Joint Replacement   Month 1 attempt successful? Yes   Call start time 1532   Call end time 1535   Has the patient been back in either the hospital or Emergency Department since discharge? No   Comments regarding appointments Pt has followed up with surgeon and cleared for light duty.   Has the patient kept scheduled appointments due by today? Yes   Is the patient still attending therapy sessions(either in the home or as an outpatient)? Yes   Has the patient fallen since discharge? No   What is the patient's perception of their functional status since discharge? Improving   Month 1 call completed? Yes   Wrap up additional comments Pt improveing nicely. Pt continues to particiapte in PT but has returned to work.          Shalonda Burgos RN

## 2022-01-22 NOTE — PROGRESS NOTES
CC: s/p right partial knee arthroplasty medial compartment, DOS 12/22/2021    Interval history: Mark Ballard returns to clinic with spouse for follow-up of his right lower extremity.  Is continued with topical application of peroxide water combo at the inferior aspect of the incision which does seem to be healing.  They were able to get some sanguinous drainage out of the knee which has been just sanguinous recently.  He continues weightbearing as tolerated is continued with physical therapy working on range of motion and strength.  Denies any residual numbness or tingling at the right lower extremity.    Exam:  Right knee exam:  Incision clean and dry intact for the most distal aspect of the incision which does have a red beefy wound bed does not appear infected, incision closed most superficial aspect of the skin remains open, continues to heal  Knee range of motion 0 degrees to 125 degrees, 5 out of 5 strength stable to varus and valgus stress at 0 degrees and 30 degrees  Positive sensation all distributions right lower extremity  Negative calf tenderness, negative Homans' sign    Assessment: Status post partial right knee arthroplasty medial compartment    Plan:  1.  Continue doxycycline plan to see him back in clinic in 1 week.  Discussed that he returns to work to keep the lower aspect of the incision covered continue with the peroxide water mix combo with application of Q-tip twice daily cover with Tegaderm when showering.  Continue with strengthening exercises as well as ice for swelling.  Encouraged to call with any questions or concerns that may occur.  All questions answered.    No orders of the defined types were placed in this encounter.

## 2022-01-26 ENCOUNTER — OFFICE VISIT (OUTPATIENT)
Dept: ORTHOPEDIC SURGERY | Facility: CLINIC | Age: 42
End: 2022-01-26

## 2022-01-26 VITALS — HEIGHT: 73 IN | WEIGHT: 298 LBS | BODY MASS INDEX: 39.49 KG/M2

## 2022-01-26 DIAGNOSIS — Z96.651 S/P RIGHT UNICOMPARTMENTAL KNEE REPLACEMENT: Primary | ICD-10-CM

## 2022-01-26 PROCEDURE — 99024 POSTOP FOLLOW-UP VISIT: CPT | Performed by: NURSE PRACTITIONER

## 2022-01-26 RX ORDER — PREDNISONE 5 MG/1
TABLET ORAL
Qty: 21 TABLET | Refills: 0 | Status: SHIPPED | OUTPATIENT
Start: 2022-01-26 | End: 2022-02-09

## 2022-01-26 NOTE — PROGRESS NOTES
CC: s/p right partial knee arthroplasty medial compartment, DOS 12/22/2021    Interval history: Mark Ballard returns to clinic for follow-up of his right knee.  Has continued working tolerating well is continued with range of motion strengthening exercises with PT at home as well as physical therapy.  Has progressed with range of motion and strength the right lower extremity.  He has discontinued any topical medications as the inferior aspect of the incision has continued to heal.  Continues to deny any presence of numbness or tingling at the right lower extremity.    Exam:  Right knee exam:  Knee range of motion 0 degrees extension to 125 degrees flexion  5 out of 5 strength  Incision well-healed scabbing noted at the most inferior aspect of the incision continues to heal  Negative calf tenderness, negative Homans' sign  Positive sensation light touch all distributions of the right lower extremity  Stable varus and valgus stress at 0 degrees and 30 degrees    Assessment: status post partial knee arthroplasty, medial compartment    Plan:  1.  Discussed wound care with patient.  Can discontinue the doxycycline.  Follow-up with Dr. Sanchez in 2 weeks with x-ray images of his right knee.  2.  Can continue with topical mupirocin ointment twice daily.  Will start prednisone taper to further reduce the swelling.  Do recommend that wrap with Ace bandage from the or the ankle wrap up the leg to help with swelling.  Continue with application of ice for swelling.  All questions answered.    New Medications Ordered This Visit   Medications   • predniSONE 5 MG (21) tablet therapy pack dose pack     Sig: Take by mouth as directed on package.     Dispense:  21 tablet     Refill:  0   • mupirocin (BACTROBAN) 2 % ointment     Sig: Apply 1 application topically to the appropriate area as directed 2 (Two) Times a Day.     Dispense:  22 g     Refill:  0

## 2022-01-31 RX ORDER — DEXLANSOPRAZOLE 30 MG/1
30 CAPSULE, DELAYED RELEASE ORAL DAILY
Qty: 30 CAPSULE | Refills: 11 | Status: CANCELLED | OUTPATIENT
Start: 2022-01-31

## 2022-02-09 ENCOUNTER — OFFICE VISIT (OUTPATIENT)
Dept: ORTHOPEDIC SURGERY | Facility: CLINIC | Age: 42
End: 2022-02-09

## 2022-02-09 VITALS — BODY MASS INDEX: 39.49 KG/M2 | HEIGHT: 73 IN | WEIGHT: 298 LBS

## 2022-02-09 DIAGNOSIS — Z96.651 S/P RIGHT UNICOMPARTMENTAL KNEE REPLACEMENT: Primary | ICD-10-CM

## 2022-02-09 PROCEDURE — 73562 X-RAY EXAM OF KNEE 3: CPT | Performed by: ORTHOPAEDIC SURGERY

## 2022-02-09 PROCEDURE — 99024 POSTOP FOLLOW-UP VISIT: CPT | Performed by: ORTHOPAEDIC SURGERY

## 2022-02-09 NOTE — PROGRESS NOTES
CC: s/p right partial knee arthroplasty, DOS 12/22/2021  Interval History: Mark Ballard returns for 7 week postoperative visit.  He is doing well. Pain is controlled with pain medication and is improving. He denies any wound problem, fevers, or chills. Patient is continuing to work with outpatient PT. Ambulating without cane.     Physical Examination: Right knee was examined   Incision well healed   ROM 0-125,  4/5 strength   Stable to varus and valgus stress   Flex/extend ankle and toes   Positive sensation right foot   No calf pain, negative Homans sign bilaterally    Radiographic review: Radiographs of the right knee 3 views, AP, lateral and patella axial views, compared to immediate postop films, indication s/p UKA,  shows that the implant is in good position. There is no evidence of implant loosening or osteolysis, no dislocation or periprosthetic fractures. Overall alignment acceptable at this time.     Assessment/Plan:  Mark Ballard is recovering from surgery as expected.  We will continue outpatient therapy for range of motion, strengthening, and gait normalization. .  He is to follow up in clinic in 6 weeks with no xrays. Patient had all question answered today. Discussed need for prophylactic antibiotics with dental/endoscopy procedures.     Medications:  No orders of the defined types were placed in this encounter.      Job Sanchez MD

## 2022-02-16 ENCOUNTER — READMISSION MANAGEMENT (OUTPATIENT)
Dept: CALL CENTER | Facility: HOSPITAL | Age: 42
End: 2022-02-16

## 2022-02-16 NOTE — OUTREACH NOTE
Total Joint Month 2 Survey      Responses   [facility] patient discharged from? [place]   Does the patient have one of the following disease processes/diagnoses(primary or secondary)? Total Joint Replacement   Joint surgery performed? Knee   Month 2 attempt successful? No   Unsuccessful attempts Attempt 1          Shalonda Burgos RN

## 2022-02-18 ENCOUNTER — READMISSION MANAGEMENT (OUTPATIENT)
Dept: CALL CENTER | Facility: HOSPITAL | Age: 42
End: 2022-02-18

## 2022-02-18 NOTE — OUTREACH NOTE
Total Joint Month 2 Survey      Responses   [facility] patient discharged from? [place]   Does the patient have one of the following disease processes/diagnoses(primary or secondary)? Total Joint Replacement   Joint surgery performed? Knee   Month 2 attempt successful? No   Unsuccessful attempts Attempt 2          Ting Oro RN

## 2022-03-21 ENCOUNTER — OFFICE VISIT (OUTPATIENT)
Dept: ORTHOPEDIC SURGERY | Facility: CLINIC | Age: 42
End: 2022-03-21

## 2022-03-21 VITALS — BODY MASS INDEX: 39.49 KG/M2 | WEIGHT: 298 LBS | HEIGHT: 73 IN

## 2022-03-21 DIAGNOSIS — Z96.651 S/P RIGHT UNICOMPARTMENTAL KNEE REPLACEMENT: Primary | ICD-10-CM

## 2022-03-21 PROCEDURE — 99024 POSTOP FOLLOW-UP VISIT: CPT | Performed by: ORTHOPAEDIC SURGERY

## 2022-03-21 NOTE — PROGRESS NOTES
The patient has consented to being recorded using MANUEL.    CC: s/p right partial knee arthroplasty, DOS 12/22/2021    Interval History: Mark Ballard returns for 12 week postoperative visit.  Patient states he  is doing well overall, and notes mild improvement in his symptoms. He reports mild pain with gait being realigned following knee arthroplasty. Of note, he notes having a rash over his incision site. The patient notes he will have an onset of pruritus. He denies applying anything to alleviate his symptoms.     The patient also complains of bilateral foot pain, and hip pain. His hip pain is localized to the medial aspect of the hip, exacerbated with laying on it. He admits to having a foam roller at home.      Physical Examination: Right knee was examined              Incision well healed              ROM 0 to 135    Strength: 4+/5 on flexion   4+/5 on extension   Small rash distal to the distal end of the incision.   Effusion- None              Stable to varus and valgus stress              Flex/extend ankle and toes              Positive sensation right foot              No calf pain, negative Homans sign bilaterally      Assessment/Plan:  1. Discussed treatment options at length with patient at today's visit.  2. Secondary to his mild IT band pain, I recommended use of a foam roller. I provided him IT band stretches and exercises today to try to improve with some lateral hip pain he is having.  3. He will follow-up with his foot and ankle doctor in regards to possibly changing up his orthotics, secondary to his increased pain with his gait being realigned following knee replacement.  4. Recommended hydrocortisone cream for his rash and keeping an eye on it. If it is not gone within 4 to 6 weeks, call us and we can talk about other options.  5. Follow-up in 3 months with repeat x-rays of right knee or sooner if he is having any issues or concerns.      Transcribed from ambient dictation for Job SANCHEZ  MD Daniel by Erick Castro.  03/21/22   09:27 EDT    Patient verbalized consent to the visit recording.  I have personally performed the services described in this document as transcribed by the above individual, and it is both accurate and complete.  Job Sanchez MD  3/21/2022  17:40 EDT
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
## 2022-03-22 ENCOUNTER — READMISSION MANAGEMENT (OUTPATIENT)
Dept: CALL CENTER | Facility: HOSPITAL | Age: 42
End: 2022-03-22

## 2022-03-22 NOTE — OUTREACH NOTE
Total Joint Month 3 Survey    Flowsheet Row Responses   [facility] patient discharged from? [place]   Does the patient have one of the following disease processes/diagnoses(primary or secondary)? Total Joint Replacement   Joint surgery performed? Knee   Month 3 attempt successful? No   Unsuccessful attempts Attempt 1          LIZ LUNA - Registered Nurse

## 2022-03-25 ENCOUNTER — READMISSION MANAGEMENT (OUTPATIENT)
Dept: CALL CENTER | Facility: HOSPITAL | Age: 42
End: 2022-03-25

## 2022-03-25 NOTE — OUTREACH NOTE
Total Joint Month 3 Survey    Flowsheet Row Responses   [facility]? [place]   Does the patient have one of the following disease processes/diagnoses(primary or secondary)? Total Joint Replacement   Joint surgery performed? Knee   Month 3 attempt successful? No   Unsuccessful attempts Attempt 2          CODY HERMAN - Registered Nurse

## 2022-04-01 RX ORDER — DEXLANSOPRAZOLE 30 MG/1
30 CAPSULE, DELAYED RELEASE ORAL DAILY
Qty: 30 CAPSULE | Refills: 11 | Status: CANCELLED | OUTPATIENT
Start: 2022-01-31

## 2022-06-20 ENCOUNTER — OFFICE VISIT (OUTPATIENT)
Dept: ORTHOPEDIC SURGERY | Facility: CLINIC | Age: 42
End: 2022-06-20

## 2022-06-20 VITALS — BODY MASS INDEX: 38.17 KG/M2 | RESPIRATION RATE: 16 BRPM | HEIGHT: 73 IN | WEIGHT: 288 LBS

## 2022-06-20 DIAGNOSIS — Z96.651 S/P RIGHT UNICOMPARTMENTAL KNEE REPLACEMENT: Primary | ICD-10-CM

## 2022-06-20 PROCEDURE — 73562 X-RAY EXAM OF KNEE 3: CPT | Performed by: ORTHOPAEDIC SURGERY

## 2022-06-20 PROCEDURE — 99213 OFFICE O/P EST LOW 20 MIN: CPT | Performed by: ORTHOPAEDIC SURGERY

## 2022-06-20 RX ORDER — DICLOFENAC SODIUM 75 MG/1
75 TABLET, DELAYED RELEASE ORAL 2 TIMES DAILY
Qty: 42 TABLET | Refills: 0 | Status: SHIPPED | OUTPATIENT
Start: 2022-06-20 | End: 2022-08-05 | Stop reason: SDUPTHER

## 2022-06-20 RX ORDER — PREDNISONE 10 MG/1
TABLET ORAL
Qty: 39 TABLET | Refills: 0 | Status: SHIPPED | OUTPATIENT
Start: 2022-06-20 | End: 2022-07-02

## 2022-08-22 ENCOUNTER — OFFICE VISIT (OUTPATIENT)
Dept: ORTHOPEDIC SURGERY | Facility: CLINIC | Age: 42
End: 2022-08-22

## 2022-08-22 VITALS — WEIGHT: 279 LBS | HEIGHT: 73 IN | BODY MASS INDEX: 36.98 KG/M2

## 2022-08-22 DIAGNOSIS — Z96.651 S/P RIGHT UNICOMPARTMENTAL KNEE REPLACEMENT: Primary | ICD-10-CM

## 2022-08-22 PROCEDURE — 99212 OFFICE O/P EST SF 10 MIN: CPT | Performed by: ORTHOPAEDIC SURGERY
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
## 2022-10-17 ENCOUNTER — OFFICE VISIT (OUTPATIENT)
Dept: ORTHOPEDIC SURGERY | Facility: CLINIC | Age: 42
End: 2022-10-17

## 2022-10-17 VITALS
SYSTOLIC BLOOD PRESSURE: 141 MMHG | WEIGHT: 289 LBS | DIASTOLIC BLOOD PRESSURE: 88 MMHG | HEART RATE: 87 BPM | HEIGHT: 73 IN | BODY MASS INDEX: 38.3 KG/M2

## 2022-10-17 DIAGNOSIS — M77.01 MEDIAL EPICONDYLITIS OF ELBOW, RIGHT: ICD-10-CM

## 2022-10-17 DIAGNOSIS — M25.521 RIGHT ELBOW PAIN: Primary | ICD-10-CM

## 2022-10-17 PROCEDURE — 99214 OFFICE O/P EST MOD 30 MIN: CPT | Performed by: ORTHOPAEDIC SURGERY

## 2022-10-17 PROCEDURE — 73080 X-RAY EXAM OF ELBOW: CPT | Performed by: ORTHOPAEDIC SURGERY

## 2022-10-17 RX ORDER — PREDNISONE 10 MG/1
TABLET ORAL
Qty: 39 TABLET | Refills: 0 | Status: SHIPPED | OUTPATIENT
Start: 2022-10-17 | End: 2022-10-31
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
## 2023-01-11 ENCOUNTER — OFFICE VISIT (OUTPATIENT)
Dept: ORTHOPEDIC SURGERY | Facility: CLINIC | Age: 43
End: 2023-01-11
Payer: COMMERCIAL

## 2023-01-11 VITALS — WEIGHT: 289 LBS | BODY MASS INDEX: 38.3 KG/M2 | HEIGHT: 73 IN

## 2023-01-11 DIAGNOSIS — Z96.651 S/P RIGHT UNICOMPARTMENTAL KNEE REPLACEMENT: Primary | ICD-10-CM

## 2023-01-11 PROCEDURE — 73562 X-RAY EXAM OF KNEE 3: CPT | Performed by: ORTHOPAEDIC SURGERY

## 2023-01-11 PROCEDURE — 99212 OFFICE O/P EST SF 10 MIN: CPT | Performed by: ORTHOPAEDIC SURGERY
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
## 2023-01-31 ENCOUNTER — OFFICE VISIT (OUTPATIENT)
Dept: ORTHOPEDIC SURGERY | Facility: CLINIC | Age: 43
End: 2023-01-31
Payer: COMMERCIAL

## 2023-01-31 VITALS
SYSTOLIC BLOOD PRESSURE: 123 MMHG | BODY MASS INDEX: 40.42 KG/M2 | WEIGHT: 305 LBS | HEART RATE: 77 BPM | DIASTOLIC BLOOD PRESSURE: 86 MMHG | HEIGHT: 73 IN

## 2023-01-31 DIAGNOSIS — M77.12 LATERAL EPICONDYLITIS OF LEFT ELBOW: Primary | ICD-10-CM

## 2023-01-31 DIAGNOSIS — R52 PAIN: ICD-10-CM

## 2023-01-31 PROCEDURE — 99214 OFFICE O/P EST MOD 30 MIN: CPT | Performed by: NURSE PRACTITIONER

## 2023-01-31 PROCEDURE — 20605 DRAIN/INJ JOINT/BURSA W/O US: CPT | Performed by: NURSE PRACTITIONER

## 2023-01-31 PROCEDURE — 73090 X-RAY EXAM OF FOREARM: CPT | Performed by: NURSE PRACTITIONER

## 2023-01-31 RX ADMIN — TRIAMCINOLONE ACETONIDE 40 MG: 40 INJECTION, SUSPENSION INTRA-ARTICULAR; INTRAMUSCULAR at 10:49

## 2023-01-31 RX ADMIN — LIDOCAINE HYDROCHLORIDE 2 ML: 10 INJECTION, SOLUTION INFILTRATION; PERINEURAL at 10:49

## 2023-02-02 PROBLEM — M77.12 LATERAL EPICONDYLITIS OF LEFT ELBOW: Status: ACTIVE | Noted: 2023-02-02

## 2023-02-02 RX ORDER — TRIAMCINOLONE ACETONIDE 40 MG/ML
40 INJECTION, SUSPENSION INTRA-ARTICULAR; INTRAMUSCULAR
Status: COMPLETED | OUTPATIENT
Start: 2023-01-31 | End: 2023-01-31

## 2023-02-02 RX ORDER — LIDOCAINE HYDROCHLORIDE 10 MG/ML
2 INJECTION, SOLUTION INFILTRATION; PERINEURAL
Status: COMPLETED | OUTPATIENT
Start: 2023-01-31 | End: 2023-01-31

## 2023-02-28 ENCOUNTER — OFFICE VISIT (OUTPATIENT)
Dept: ORTHOPEDIC SURGERY | Facility: CLINIC | Age: 43
End: 2023-02-28
Payer: COMMERCIAL

## 2023-02-28 VITALS — HEIGHT: 73 IN | WEIGHT: 305 LBS | BODY MASS INDEX: 40.42 KG/M2

## 2023-02-28 DIAGNOSIS — M77.12 LATERAL EPICONDYLITIS OF LEFT ELBOW: Primary | ICD-10-CM

## 2023-02-28 PROCEDURE — 99213 OFFICE O/P EST LOW 20 MIN: CPT | Performed by: NURSE PRACTITIONER
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
## 2023-08-21 ENCOUNTER — OFFICE VISIT (OUTPATIENT)
Dept: ORTHOPEDIC SURGERY | Facility: CLINIC | Age: 43
End: 2023-08-21
Payer: COMMERCIAL

## 2023-08-21 VITALS
DIASTOLIC BLOOD PRESSURE: 74 MMHG | HEART RATE: 86 BPM | WEIGHT: 298.8 LBS | BODY MASS INDEX: 39.6 KG/M2 | HEIGHT: 73 IN | SYSTOLIC BLOOD PRESSURE: 107 MMHG

## 2023-08-21 DIAGNOSIS — M70.61 GREATER TROCHANTERIC BURSITIS, RIGHT: Primary | ICD-10-CM

## 2023-08-21 DIAGNOSIS — M25.551 RIGHT HIP PAIN: ICD-10-CM

## 2023-08-21 PROCEDURE — 99214 OFFICE O/P EST MOD 30 MIN: CPT | Performed by: NURSE PRACTITIONER

## 2023-08-21 PROCEDURE — 73502 X-RAY EXAM HIP UNI 2-3 VIEWS: CPT | Performed by: NURSE PRACTITIONER

## 2023-08-21 PROCEDURE — 20610 DRAIN/INJ JOINT/BURSA W/O US: CPT | Performed by: NURSE PRACTITIONER

## 2023-08-21 RX ORDER — TRIAMCINOLONE ACETONIDE 40 MG/ML
80 INJECTION, SUSPENSION INTRA-ARTICULAR; INTRAMUSCULAR
Status: COMPLETED | OUTPATIENT
Start: 2023-08-21 | End: 2023-08-21

## 2023-08-21 RX ORDER — LIDOCAINE HYDROCHLORIDE 10 MG/ML
4 INJECTION, SOLUTION EPIDURAL; INFILTRATION; INTRACAUDAL; PERINEURAL
Status: COMPLETED | OUTPATIENT
Start: 2023-08-21 | End: 2023-08-21

## 2023-08-21 RX ORDER — CELECOXIB 200 MG/1
200 CAPSULE ORAL DAILY
Qty: 30 CAPSULE | Refills: 0 | Status: SHIPPED | OUTPATIENT
Start: 2023-08-21

## 2023-08-21 RX ADMIN — LIDOCAINE HYDROCHLORIDE 4 ML: 10 INJECTION, SOLUTION EPIDURAL; INFILTRATION; INTRACAUDAL; PERINEURAL at 10:15

## 2023-08-21 RX ADMIN — TRIAMCINOLONE ACETONIDE 80 MG: 40 INJECTION, SUSPENSION INTRA-ARTICULAR; INTRAMUSCULAR at 10:15
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
## 2023-10-17 ENCOUNTER — PATIENT MESSAGE (OUTPATIENT)
Dept: ORTHOPEDIC SURGERY | Facility: CLINIC | Age: 43
End: 2023-10-17
Payer: COMMERCIAL

## 2023-10-17 ENCOUNTER — TELEPHONE (OUTPATIENT)
Dept: ORTHOPEDIC SURGERY | Facility: CLINIC | Age: 43
End: 2023-10-17
Payer: COMMERCIAL

## 2023-10-17 RX ORDER — BACLOFEN 20 MG/1
20 TABLET ORAL 3 TIMES DAILY
Qty: 30 TABLET | Refills: 0 | Status: SHIPPED | OUTPATIENT
Start: 2023-10-17

## 2023-10-17 RX ORDER — PREDNISONE 10 MG/1
TABLET ORAL
Qty: 39 TABLET | Refills: 0 | Status: SHIPPED | OUTPATIENT
Start: 2023-10-17 | End: 2023-10-30

## 2023-10-17 NOTE — TELEPHONE ENCOUNTER
----- Message from Mark Ballard sent at 10/17/2023  1:00 PM EDT -----  Regarding: Appointment Request  Contact: [phone]  Appointment Request From: Mark Ballard    With Provider: Luisa Villaseñor [[facility]]    Preferred Date Range: 10/23/2023 – 10/24/2023    Preferred Times: Any Time    Reason for visit: Follow-up    Comments:  Am appointment is preferred   Right Hip pain

## 2023-10-18 NOTE — TELEPHONE ENCOUNTER
Patient aware of the two RX's sent in- did advise that patient should hold his Celebrex while taking the high dose prednisone.    Thanks.

## 2023-10-25 ENCOUNTER — OFFICE VISIT (OUTPATIENT)
Dept: ORTHOPEDIC SURGERY | Facility: CLINIC | Age: 43
End: 2023-10-25
Payer: COMMERCIAL

## 2023-10-25 VITALS — HEIGHT: 73 IN | WEIGHT: 298 LBS | BODY MASS INDEX: 39.49 KG/M2

## 2023-10-25 DIAGNOSIS — M51.16 LUMBAR DISC DISEASE WITH RADICULOPATHY: Primary | ICD-10-CM

## 2023-10-25 DIAGNOSIS — M54.50 CHRONIC LOW BACK PAIN, UNSPECIFIED BACK PAIN LATERALITY, UNSPECIFIED WHETHER SCIATICA PRESENT: ICD-10-CM

## 2023-10-25 DIAGNOSIS — G89.29 CHRONIC LOW BACK PAIN, UNSPECIFIED BACK PAIN LATERALITY, UNSPECIFIED WHETHER SCIATICA PRESENT: ICD-10-CM

## 2023-10-25 PROCEDURE — 99214 OFFICE O/P EST MOD 30 MIN: CPT | Performed by: NURSE PRACTITIONER

## 2023-10-25 RX ORDER — CELECOXIB 200 MG/1
200 CAPSULE ORAL 2 TIMES DAILY
Qty: 60 CAPSULE | Refills: 0 | Status: SHIPPED | OUTPATIENT
Start: 2023-10-25
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
## 2023-11-06 ENCOUNTER — HOSPITAL ENCOUNTER (OUTPATIENT)
Dept: PHYSICAL THERAPY | Facility: HOSPITAL | Age: 43
Setting detail: THERAPIES SERIES
Discharge: HOME | End: 2023-11-06
Payer: COMMERCIAL

## 2023-11-06 DIAGNOSIS — M51.16 LUMBAR DISC DISEASE WITH RADICULOPATHY: Primary | ICD-10-CM

## 2023-11-06 PROCEDURE — 97161 PT EVAL LOW COMPLEX 20 MIN: CPT | Performed by: PHYSICAL THERAPIST
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
## 2023-11-08 ENCOUNTER — HOSPITAL ENCOUNTER (OUTPATIENT)
Dept: PHYSICAL THERAPY | Facility: HOSPITAL | Age: 43
Setting detail: THERAPIES SERIES
Discharge: HOME | End: 2023-11-08
Payer: COMMERCIAL

## 2023-11-08 DIAGNOSIS — M51.16 LUMBAR DISC DISEASE WITH RADICULOPATHY: Primary | ICD-10-CM

## 2023-11-08 PROCEDURE — 97012 MECHANICAL TRACTION THERAPY: CPT | Performed by: PHYSICAL THERAPIST
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
## 2023-11-10 ENCOUNTER — HOSPITAL ENCOUNTER (OUTPATIENT)
Dept: MRI IMAGING | Facility: HOSPITAL | Age: 43
Discharge: HOME | End: 2023-11-10
Admitting: NURSE PRACTITIONER
Payer: COMMERCIAL

## 2023-11-10 DIAGNOSIS — M51.16 LUMBAR DISC DISEASE WITH RADICULOPATHY: ICD-10-CM

## 2023-11-10 DIAGNOSIS — M51.16 LUMBAR DISC DISEASE WITH RADICULOPATHY: Primary | ICD-10-CM

## 2023-11-10 PROCEDURE — 72148 MRI LUMBAR SPINE W/O DYE: CPT

## 2023-11-10 RX ORDER — HYDROCODONE BITARTRATE AND ACETAMINOPHEN 5; 325 MG/1; MG/1
1-2 TABLET ORAL EVERY 6 HOURS PRN
Qty: 30 TABLET | Refills: 0 | Status: SHIPPED | OUTPATIENT
Start: 2023-11-10

## 2023-11-10 RX ORDER — PREDNISONE 10 MG/1
TABLET ORAL
Qty: 39 TABLET | Refills: 0 | Status: SHIPPED | OUTPATIENT
Start: 2023-11-10

## 2023-11-15 ENCOUNTER — APPOINTMENT (OUTPATIENT)
Dept: PHYSICAL THERAPY | Facility: HOSPITAL | Age: 43
End: 2023-11-15
Payer: COMMERCIAL

## 2023-11-16 ENCOUNTER — OFFICE VISIT (OUTPATIENT)
Dept: NEUROSURGERY | Facility: CLINIC | Age: 43
End: 2023-11-16
Payer: COMMERCIAL

## 2023-11-16 VITALS
HEIGHT: 73 IN | SYSTOLIC BLOOD PRESSURE: 112 MMHG | OXYGEN SATURATION: 96 % | HEART RATE: 111 BPM | RESPIRATION RATE: 18 BRPM | BODY MASS INDEX: 39.49 KG/M2 | WEIGHT: 298 LBS | DIASTOLIC BLOOD PRESSURE: 70 MMHG

## 2023-11-16 DIAGNOSIS — M43.16 SPONDYLOLISTHESIS OF LUMBAR REGION: ICD-10-CM

## 2023-11-16 DIAGNOSIS — M71.30 SYNOVIAL CYST: Primary | ICD-10-CM

## 2023-11-16 RX ORDER — GABAPENTIN 100 MG/1
CAPSULE ORAL
Qty: 140 CAPSULE | Refills: 2 | Status: SHIPPED | OUTPATIENT
Start: 2023-11-16 | End: 2024-01-14
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
## 2023-11-29 ENCOUNTER — OFFICE VISIT (OUTPATIENT)
Dept: ORTHOPEDIC SURGERY | Facility: CLINIC | Age: 43
End: 2023-11-29
Payer: COMMERCIAL

## 2023-11-29 VITALS — HEIGHT: 73 IN | BODY MASS INDEX: 39.49 KG/M2 | WEIGHT: 298 LBS

## 2023-11-29 DIAGNOSIS — M51.16 LUMBAR DISC DISEASE WITH RADICULOPATHY: Primary | ICD-10-CM

## 2023-11-29 PROCEDURE — 99214 OFFICE O/P EST MOD 30 MIN: CPT | Performed by: NURSE PRACTITIONER

## 2023-11-29 RX ORDER — DICLOFENAC SODIUM 75 MG/1
75 TABLET, DELAYED RELEASE ORAL 2 TIMES DAILY
Qty: 60 TABLET | Refills: 4 | Status: SHIPPED | OUTPATIENT
Start: 2023-11-29

## 2023-11-29 RX ORDER — GABAPENTIN 300 MG/1
300 CAPSULE ORAL 3 TIMES DAILY
Qty: 90 CAPSULE | Refills: 1 | Status: SHIPPED | OUTPATIENT
Start: 2023-11-29

## 2023-11-29 RX ORDER — HYDROCODONE BITARTRATE AND ACETAMINOPHEN 7.5; 325 MG/1; MG/1
1 TABLET ORAL EVERY 6 HOURS PRN
Qty: 30 TABLET | Refills: 0 | Status: SHIPPED | OUTPATIENT
Start: 2023-11-29

## 2023-11-29 RX ORDER — HYDROCODONE BITARTRATE AND ACETAMINOPHEN 7.5; 325 MG/1; MG/1
1 TABLET ORAL EVERY 6 HOURS PRN
Qty: 30 TABLET | Refills: 0 | Status: SHIPPED | OUTPATIENT
Start: 2023-11-29 | End: 2023-11-29 | Stop reason: SDUPTHER
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
## 2023-11-30 ENCOUNTER — HOSPITAL ENCOUNTER (OUTPATIENT)
Dept: PHYSICAL THERAPY | Facility: HOSPITAL | Age: 43
Setting detail: THERAPIES SERIES
Discharge: HOME | End: 2023-11-30
Payer: COMMERCIAL

## 2023-11-30 DIAGNOSIS — M51.16 LUMBAR DISC DISEASE WITH RADICULOPATHY: Primary | ICD-10-CM

## 2023-11-30 PROCEDURE — G0283 ELEC STIM OTHER THAN WOUND: HCPCS | Performed by: PHYSICAL THERAPIST

## 2023-11-30 PROCEDURE — 97110 THERAPEUTIC EXERCISES: CPT | Performed by: PHYSICAL THERAPIST
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
## 2023-12-01 ENCOUNTER — ANESTHESIA EVENT (OUTPATIENT)
Dept: PAIN MEDICINE | Facility: HOSPITAL | Age: 43
End: 2023-12-01
Payer: COMMERCIAL

## 2023-12-01 ENCOUNTER — HOSPITAL ENCOUNTER (OUTPATIENT)
Dept: GENERAL RADIOLOGY | Facility: HOSPITAL | Age: 43
Discharge: HOME | End: 2023-12-01
Payer: COMMERCIAL

## 2023-12-01 ENCOUNTER — HOSPITAL ENCOUNTER (OUTPATIENT)
Dept: PAIN MEDICINE | Facility: HOSPITAL | Age: 43
Discharge: HOME | End: 2023-12-01
Payer: COMMERCIAL

## 2023-12-01 ENCOUNTER — ANESTHESIA (OUTPATIENT)
Dept: PAIN MEDICINE | Facility: HOSPITAL | Age: 43
End: 2023-12-01
Payer: COMMERCIAL

## 2023-12-01 VITALS
OXYGEN SATURATION: 96 % | HEIGHT: 73 IN | WEIGHT: 283 LBS | SYSTOLIC BLOOD PRESSURE: 128 MMHG | TEMPERATURE: 97.8 F | RESPIRATION RATE: 14 BRPM | BODY MASS INDEX: 37.51 KG/M2 | HEART RATE: 82 BPM | DIASTOLIC BLOOD PRESSURE: 82 MMHG

## 2023-12-01 DIAGNOSIS — R52 PAIN: ICD-10-CM

## 2023-12-01 DIAGNOSIS — M43.16 SPONDYLOLISTHESIS OF LUMBAR REGION: Primary | ICD-10-CM

## 2023-12-01 PROCEDURE — 25510000001 IOPAMIDOL 41 % SOLUTION: Performed by: ANESTHESIOLOGY

## 2023-12-01 PROCEDURE — 25010000002 METHYLPREDNISOLONE PER 80 MG: Performed by: ANESTHESIOLOGY

## 2023-12-01 PROCEDURE — 77003 FLUOROGUIDE FOR SPINE INJECT: CPT

## 2023-12-01 RX ORDER — MIDAZOLAM HYDROCHLORIDE 1 MG/ML
1 INJECTION INTRAMUSCULAR; INTRAVENOUS ONCE AS NEEDED
Status: DISCONTINUED | OUTPATIENT
Start: 2023-12-01 | End: 2023-12-02 | Stop reason: HOSPADM

## 2023-12-01 RX ORDER — LIDOCAINE HYDROCHLORIDE 10 MG/ML
1 INJECTION, SOLUTION INFILTRATION; PERINEURAL ONCE
Status: DISCONTINUED | OUTPATIENT
Start: 2023-12-01 | End: 2023-12-02 | Stop reason: HOSPADM

## 2023-12-01 RX ORDER — IOPAMIDOL 408 MG/ML
10 INJECTION, SOLUTION INTRATHECAL
Status: COMPLETED | OUTPATIENT
Start: 2023-12-01 | End: 2023-12-01

## 2023-12-01 RX ORDER — FENTANYL CITRATE 50 UG/ML
50 INJECTION, SOLUTION INTRAMUSCULAR; INTRAVENOUS ONCE
Status: DISCONTINUED | OUTPATIENT
Start: 2023-12-01 | End: 2023-12-02 | Stop reason: HOSPADM

## 2023-12-01 RX ORDER — METHYLPREDNISOLONE ACETATE 80 MG/ML
80 INJECTION, SUSPENSION INTRA-ARTICULAR; INTRALESIONAL; INTRAMUSCULAR; SOFT TISSUE ONCE
Status: COMPLETED | OUTPATIENT
Start: 2023-12-01 | End: 2023-12-01

## 2023-12-01 RX ADMIN — METHYLPREDNISOLONE ACETATE 80 MG: 80 INJECTION, SUSPENSION INTRA-ARTICULAR; INTRALESIONAL; INTRAMUSCULAR; SOFT TISSUE at 09:05

## 2023-12-01 RX ADMIN — IOPAMIDOL 10 ML: 408 INJECTION, SOLUTION INTRATHECAL at 09:05
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
## 2023-12-01 NOTE — ANESTHESIA PROCEDURE NOTES
PAIN Epidural block    Pre-sedation assessment completed: 12/1/2023 9:00 AM    Patient reassessed immediately prior to procedure    Patient location during procedure: pain clinic  Start Time: 12/1/2023 9:01 AM  Stop Time: 12/1/2023 9:08 AM  Indication:at surgeon's request and procedure for pain  Performed By  Anesthesiologist: Pato Sterling MD  Preanesthetic Checklist  Completed: patient identified, risks and benefits discussed, surgical consent, monitors and equipment checked, pre-op evaluation and timeout performed  Additional Notes  Post-Op Diagnosis Codes:     * Lumbar radiculopathy [M54.16]     * Degeneration of lumbar intervertebral disc [M51.36]     * Lumbar spondylosis [M47.816]    Prep:  Pt Position:prone  Sterile Tech:sterile barrier, mask and gloves  Prep:chlorhexidine gluconate and isopropyl alcohol  Monitoring:blood pressure monitoring, continuous pulse oximetry and EKG  Procedure:  Approach:right paramedian  Guidance: fluoroscopy  Location:lumbar  Level:4-5  Needle Gauge:20 G  Aspiration:negative  Test Dose:negative  Medications:  Isovue:2mL  Comments:Epidural was performed at L4-5 level.  There is Nexa loss resistance to injection.  Needle was at the hub on the skin.  When injecting contrast there was some extravasation however advancing the needle ever so slightly again because her release of resistance.  There is no return of red cells or CSF 80 mg of Depo-Medrol were slowly injected he tolerated the procedure wellDepomedrol:80mg  Post Assessment:  Pt Tolerance:patient tolerated the procedure well with no apparent complications  Complications:no

## 2023-12-01 NOTE — DISCHARGE INSTRUCTIONS

## 2023-12-06 ENCOUNTER — HOSPITAL ENCOUNTER (OUTPATIENT)
Dept: PHYSICAL THERAPY | Facility: HOSPITAL | Age: 43
Setting detail: THERAPIES SERIES
Discharge: HOME | End: 2023-12-06
Payer: COMMERCIAL

## 2023-12-06 DIAGNOSIS — M51.16 LUMBAR DISC DISEASE WITH RADICULOPATHY: Primary | ICD-10-CM

## 2023-12-06 PROCEDURE — 97110 THERAPEUTIC EXERCISES: CPT | Performed by: PHYSICAL THERAPIST

## 2023-12-06 PROCEDURE — G0283 ELEC STIM OTHER THAN WOUND: HCPCS | Performed by: PHYSICAL THERAPIST
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
## 2023-12-08 ENCOUNTER — HOSPITAL ENCOUNTER (OUTPATIENT)
Dept: PHYSICAL THERAPY | Facility: HOSPITAL | Age: 43
Setting detail: THERAPIES SERIES
Discharge: HOME | End: 2023-12-08
Payer: COMMERCIAL

## 2023-12-08 ENCOUNTER — PATIENT MESSAGE (OUTPATIENT)
Dept: NEUROSURGERY | Facility: CLINIC | Age: 43
End: 2023-12-08
Payer: COMMERCIAL

## 2023-12-08 DIAGNOSIS — M51.16 LUMBAR DISC DISEASE WITH RADICULOPATHY: Primary | ICD-10-CM

## 2023-12-08 PROCEDURE — G0283 ELEC STIM OTHER THAN WOUND: HCPCS | Performed by: PHYSICAL THERAPIST

## 2023-12-08 PROCEDURE — 97110 THERAPEUTIC EXERCISES: CPT | Performed by: PHYSICAL THERAPIST

## 2023-12-08 NOTE — TELEPHONE ENCOUNTER
Lets schedule him for a return visit to clinic sometime next week.  He is probably going to need surgery.
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
## 2023-12-08 NOTE — TELEPHONE ENCOUNTER
From: Mark Ballard  To: Adolfo Gonzalez  Sent: 12/8/2023 11:45 AM EST  Subject: Increased pain/tingling in back and leg    I have had no relief from the epidural I had on 12/1/23. The pain has increased significantly across my lower back. The tingling, numbness, sharp pain/sensations have gotten worse and is constant down my right leg unless I’m laying flat. The moment I set up it immediately starts up again. I have been going to PT which hasn’t brought any relief either. I had PT this am and my therapist advised me to get in contact with you since everything has gotten worse. He was concerned that there is more compression on the nerves. The only relief I get is when I am able to take a pain pill which that isn’t very often or laying flat.     What do you suggest or what do you want me to do?
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
## 2023-12-14 ENCOUNTER — OFFICE VISIT (OUTPATIENT)
Dept: NEUROSURGERY | Facility: CLINIC | Age: 43
End: 2023-12-14
Payer: COMMERCIAL

## 2023-12-14 ENCOUNTER — HOSPITAL ENCOUNTER (OUTPATIENT)
Dept: GENERAL RADIOLOGY | Facility: HOSPITAL | Age: 43
Discharge: HOME | End: 2023-12-14
Payer: COMMERCIAL

## 2023-12-14 VITALS
HEART RATE: 104 BPM | DIASTOLIC BLOOD PRESSURE: 80 MMHG | OXYGEN SATURATION: 96 % | SYSTOLIC BLOOD PRESSURE: 124 MMHG | WEIGHT: 283 LBS | HEIGHT: 73 IN | BODY MASS INDEX: 37.51 KG/M2 | RESPIRATION RATE: 18 BRPM

## 2023-12-14 DIAGNOSIS — M71.30 SYNOVIAL CYST: ICD-10-CM

## 2023-12-14 DIAGNOSIS — M43.16 SPONDYLOLISTHESIS OF LUMBAR REGION: ICD-10-CM

## 2023-12-14 DIAGNOSIS — M71.30 SYNOVIAL CYST: Primary | ICD-10-CM

## 2023-12-14 PROCEDURE — 72082 X-RAY EXAM ENTIRE SPI 2/3 VW: CPT

## 2023-12-14 PROCEDURE — 72114 X-RAY EXAM L-S SPINE BENDING: CPT

## 2023-12-15 ENCOUNTER — HOSPITAL ENCOUNTER (OUTPATIENT)
Dept: PHYSICAL THERAPY | Facility: HOSPITAL | Age: 43
Setting detail: THERAPIES SERIES
Discharge: HOME | End: 2023-12-15
Payer: COMMERCIAL

## 2023-12-29 ENCOUNTER — HOSPITAL ENCOUNTER (OUTPATIENT)
Dept: PAIN MEDICINE | Facility: HOSPITAL | Age: 43
Discharge: HOME | End: 2023-12-29
Payer: COMMERCIAL

## 2023-12-29 ENCOUNTER — ANESTHESIA (OUTPATIENT)
Dept: PAIN MEDICINE | Facility: HOSPITAL | Age: 43
End: 2023-12-29
Payer: COMMERCIAL

## 2023-12-29 ENCOUNTER — ANESTHESIA EVENT (OUTPATIENT)
Dept: PAIN MEDICINE | Facility: HOSPITAL | Age: 43
End: 2023-12-29
Payer: COMMERCIAL

## 2023-12-29 ENCOUNTER — HOSPITAL ENCOUNTER (OUTPATIENT)
Dept: GENERAL RADIOLOGY | Facility: HOSPITAL | Age: 43
Discharge: HOME | End: 2023-12-29
Payer: COMMERCIAL

## 2023-12-29 VITALS
OXYGEN SATURATION: 97 % | SYSTOLIC BLOOD PRESSURE: 113 MMHG | HEART RATE: 72 BPM | DIASTOLIC BLOOD PRESSURE: 77 MMHG | RESPIRATION RATE: 16 BRPM | TEMPERATURE: 98.7 F

## 2023-12-29 DIAGNOSIS — R52 PAIN: ICD-10-CM

## 2023-12-29 DIAGNOSIS — M54.16 LUMBAR RADICULOPATHY: Primary | ICD-10-CM

## 2023-12-29 PROCEDURE — 77003 FLUOROGUIDE FOR SPINE INJECT: CPT

## 2023-12-29 PROCEDURE — 25510000001 IOPAMIDOL 41 % SOLUTION: Performed by: ANESTHESIOLOGY

## 2023-12-29 RX ORDER — MIDAZOLAM HYDROCHLORIDE 1 MG/ML
1 INJECTION INTRAMUSCULAR; INTRAVENOUS
Status: DISCONTINUED | OUTPATIENT
Start: 2023-12-29 | End: 2023-12-30 | Stop reason: HOSPADM

## 2023-12-29 RX ORDER — FENTANYL CITRATE 50 UG/ML
50 INJECTION, SOLUTION INTRAMUSCULAR; INTRAVENOUS AS NEEDED
Status: DISCONTINUED | OUTPATIENT
Start: 2023-12-29 | End: 2023-12-30 | Stop reason: HOSPADM

## 2023-12-29 RX ORDER — IOPAMIDOL 408 MG/ML
3 INJECTION, SOLUTION INTRATHECAL
Status: COMPLETED | OUTPATIENT
Start: 2023-12-29 | End: 2023-12-29

## 2023-12-29 RX ORDER — LIDOCAINE HYDROCHLORIDE 10 MG/ML
1 INJECTION, SOLUTION INFILTRATION; PERINEURAL ONCE
Status: DISCONTINUED | OUTPATIENT
Start: 2023-12-29 | End: 2023-12-30 | Stop reason: HOSPADM

## 2023-12-29 RX ORDER — METHYLPREDNISOLONE ACETATE 80 MG/ML
80 INJECTION, SUSPENSION INTRA-ARTICULAR; INTRALESIONAL; INTRAMUSCULAR; SOFT TISSUE ONCE
Status: DISCONTINUED | OUTPATIENT
Start: 2023-12-29 | End: 2023-12-30 | Stop reason: HOSPADM

## 2023-12-29 RX ADMIN — IOPAMIDOL 10 ML: 408 INJECTION, SOLUTION INTRATHECAL at 15:15

## 2023-12-29 NOTE — ANESTHESIA PROCEDURE NOTES
PAIN Epidural block    Pre-sedation assessment completed: 12/29/2023 3:10 PM    Patient reassessed immediately prior to procedure    Patient location during procedure: pain clinic  Start Time: 12/29/2023 3:10 PM  Stop Time: 12/29/2023 3:21 PM  Indication:at surgeon's request and procedure for pain  Performed By  Anesthesiologist: Ivan Presley MD  Preanesthetic Checklist  Completed: patient identified, IV checked, site marked, risks and benefits discussed, surgical consent, monitors and equipment checked, pre-op evaluation and timeout performed  Additional Notes  Dx:  Post-Op Diagnosis Codes:     * Lumbar radiculopathy [M54.16]     * Lumbar spondylosis [M47.816]     * Degeneration of lumbar intervertebral disc [M51.36]  80 mg depomedrol in epid    Plan : return to clinic as needed  Prep:  Pt Position:prone (prone)  Sterile Tech:cap, gloves, mask and sterile barrier  Prep:chlorhexidine gluconate and isopropyl alcohol  Monitoring:blood pressure monitoring, EKG and continuous pulse oximetry  Procedure:Sedation: no     Approach:midline  Guidance: fluoroscopy and c arm pa and lat and loss of resistance  Location:lumbar  Level:4-5 (interlaminar)  Needle Type:Treeveomarilyn  Needle Gauge:20  Aspiration:negative  Medications:  Preservative Free Saline:3mL  Isovue:2mL  Depomedrol:80 mg  Post Assessment:  Pt Tolerance:patient tolerated the procedure well with no apparent complications  Complications:no

## 2023-12-29 NOTE — DISCHARGE INSTRUCTIONS

## 2023-12-29 NOTE — H&P
INTERVAL HISTORY:    The patient returns for another Lumbar epidural steroid injection 2 today.  They have received 0% improvement since their last injection with a pain level of 10/10 at its worst recently.    Conservative measures tried in the interim. Daily activities are still affected by the pain.    Radiology reports and/or previous notes have been reviewed and are consistent with their diagnosis of Post-Op Diagnosis Codes:     * Lumbar radiculopathy [M54.16]     * Lumbar spondylosis [M47.816]     * Degeneration of lumbar intervertebral disc [M51.36]    Alert and oriented  MP - 2  Lungs - clear  CV - rrr    Diagnosis:  Post-Op Diagnosis Codes:     * Lumbar radiculopathy [M54.16]     * Lumbar spondylosis [M47.816]     * Degeneration of lumbar intervertebral disc [M51.36]      Plan:  Lumbar epidural steroid injection under fluoroscopic guidance        Target : L4-5    I have encouraged them to continue:  1.  Physical therapy exercises at home as prescribed by physical therapy or from the pain clinic handout (given to the patient).  Continuation of these exercises every day, or multiple times per week, even when the patient has good pain relief, was stressed to the patient as a preventative measure to decrease the frequency and severity of future pain episodes.  2.  Continue pain medicines as already prescribed.  If patient not currently taking any, it is recommended to begin Acetaminophen 1000 mg po q 8 hours.  If other medicines containing Acetaminophen are currently prescribed, maintain daily dose at 3000mg.    3.  If they can tolerate NSAIDS, it is recommended to take Ibuprofen 600 mg po q 6 hours for 7 days during pain exacerbations.   Alternatively, they may substitute an NSAID of their choice (e.g. Aleve)  4.  Heat and ice to the affected area as tolerated for pain control.  It was discussed that heating pads can cause burns.  5.  Low impact exercise such as walking or water exercise was recommended to  maintain overall health and aid in weight control.   6.  Follow up as needed for subsequent injections.  7.  Patient was counseled to abstain from tobacco products.    Time :  17  min

## 2024-01-08 ENCOUNTER — TELEPHONE (OUTPATIENT)
Dept: NEUROSURGERY | Facility: CLINIC | Age: 44
End: 2024-01-08

## 2024-01-08 NOTE — TELEPHONE ENCOUNTER
PATIENT VENKAT CHAIDEZ'S WIFE, LEANA, CALLED TO SAY THAT THE PATIENT'S SECOND EPIDURAL HAS NOT HELPED. THE PATIENT'S SYMPTOMS HAVE GOTTEN WORSE AND MORE PERSISTENT WITHOUT ANY RELIEF. SHE DOES NOT THINK THAT THE PATIENT CAN MAKE IT TIL HIS FOLLOW-UP ON 01/18/24. THEY ARE WANTING TO SCHEDULE SX ASAP. PLEASE CALL TO SCHEDULE. THANKS.     PATIENT'S WIFE'S # [phone]     REASON: SX SCHEDULING    CALL BACK ASAP REQUESTED

## 2024-01-11 ENCOUNTER — OFFICE VISIT (OUTPATIENT)
Dept: NEUROSURGERY | Facility: CLINIC | Age: 44
End: 2024-01-11
Payer: COMMERCIAL

## 2024-01-11 ENCOUNTER — OFFICE VISIT (OUTPATIENT)
Dept: ORTHOPEDIC SURGERY | Facility: CLINIC | Age: 44
End: 2024-01-11
Payer: COMMERCIAL

## 2024-01-11 VITALS
BODY MASS INDEX: 38.97 KG/M2 | RESPIRATION RATE: 16 BRPM | SYSTOLIC BLOOD PRESSURE: 104 MMHG | HEART RATE: 87 BPM | WEIGHT: 294 LBS | OXYGEN SATURATION: 96 % | DIASTOLIC BLOOD PRESSURE: 62 MMHG | HEIGHT: 73 IN

## 2024-01-11 VITALS — HEIGHT: 73 IN | WEIGHT: 283 LBS | BODY MASS INDEX: 37.51 KG/M2

## 2024-01-11 DIAGNOSIS — M51.16 LUMBAR DISC DISEASE WITH RADICULOPATHY: ICD-10-CM

## 2024-01-11 DIAGNOSIS — M71.30 SYNOVIAL CYST: ICD-10-CM

## 2024-01-11 DIAGNOSIS — M43.16 SPONDYLOLISTHESIS OF LUMBAR REGION: Primary | ICD-10-CM

## 2024-01-11 DIAGNOSIS — Z96.651 S/P RIGHT UNICOMPARTMENTAL KNEE REPLACEMENT: Primary | ICD-10-CM

## 2024-01-11 PROCEDURE — 99212 OFFICE O/P EST SF 10 MIN: CPT | Performed by: ORTHOPAEDIC SURGERY

## 2024-01-11 PROCEDURE — 73562 X-RAY EXAM OF KNEE 3: CPT | Performed by: ORTHOPAEDIC SURGERY

## 2024-01-11 RX ORDER — GABAPENTIN 300 MG/1
300 CAPSULE ORAL 3 TIMES DAILY
Qty: 90 CAPSULE | Refills: 2 | Status: SHIPPED | OUTPATIENT
Start: 2024-01-11

## 2024-01-11 RX ORDER — DULOXETIN HYDROCHLORIDE 20 MG/1
CAPSULE, DELAYED RELEASE ORAL
Qty: 127 CAPSULE | Refills: 0 | Status: SHIPPED | OUTPATIENT
Start: 2024-01-11 | End: 2024-03-19
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
## 2024-02-28 ENCOUNTER — OFFICE VISIT (OUTPATIENT)
Dept: NEUROSURGERY | Facility: CLINIC | Age: 44
End: 2024-02-28
Payer: COMMERCIAL

## 2024-02-28 VITALS
OXYGEN SATURATION: 97 % | HEIGHT: 73 IN | BODY MASS INDEX: 37.51 KG/M2 | SYSTOLIC BLOOD PRESSURE: 112 MMHG | HEART RATE: 92 BPM | WEIGHT: 283 LBS | DIASTOLIC BLOOD PRESSURE: 72 MMHG | RESPIRATION RATE: 16 BRPM

## 2024-02-28 DIAGNOSIS — M43.16 SPONDYLOLISTHESIS OF LUMBAR REGION: ICD-10-CM

## 2024-02-28 DIAGNOSIS — M71.30 SYNOVIAL CYST: Primary | ICD-10-CM

## 2024-03-04 ENCOUNTER — PRE-ADMISSION TESTING (OUTPATIENT)
Dept: PREADMISSION TESTING | Facility: HOSPITAL | Age: 44
End: 2024-03-04
Payer: COMMERCIAL

## 2024-03-04 VITALS
BODY MASS INDEX: 36.65 KG/M2 | RESPIRATION RATE: 18 BRPM | SYSTOLIC BLOOD PRESSURE: 115 MMHG | HEART RATE: 88 BPM | HEIGHT: 73 IN | WEIGHT: 276.5 LBS | DIASTOLIC BLOOD PRESSURE: 82 MMHG | OXYGEN SATURATION: 98 % | TEMPERATURE: 98.8 F

## 2024-03-04 DIAGNOSIS — M71.30 SYNOVIAL CYST: ICD-10-CM

## 2024-03-04 DIAGNOSIS — M51.16 LUMBAR DISC DISEASE WITH RADICULOPATHY: ICD-10-CM

## 2024-03-04 LAB
ABO GROUP BLD: NORMAL
ALBUMIN SERPL-MCNC: 4.1 G/DL (ref 3.5–5.2)
ALBUMIN/GLOB SERPL: 1.4 G/DL
ALP SERPL-CCNC: 105 U/L (ref 39–117)
ALT SERPL W P-5'-P-CCNC: 64 U/L (ref 1–41)
ANION GAP SERPL CALCULATED.3IONS-SCNC: 10.6 MMOL/L (ref 5–15)
APTT PPP: 27.7 SECONDS (ref 22.7–35.4)
AST SERPL-CCNC: 34 U/L (ref 1–40)
BASOPHILS # BLD AUTO: 0.04 10*3/MM3 (ref 0–0.2)
BASOPHILS NFR BLD AUTO: 0.7 % (ref 0–1.5)
BILIRUB SERPL-MCNC: 0.3 MG/DL (ref 0–1.2)
BLD GP AB SCN SERPL QL: NEGATIVE
BUN SERPL-MCNC: 21 MG/DL (ref 6–20)
BUN/CREAT SERPL: 21.9 (ref 7–25)
CALCIUM SPEC-SCNC: 8.9 MG/DL (ref 8.6–10.5)
CHLORIDE SERPL-SCNC: 104 MMOL/L (ref 98–107)
CO2 SERPL-SCNC: 24.4 MMOL/L (ref 22–29)
CREAT SERPL-MCNC: 0.96 MG/DL (ref 0.76–1.27)
DEPRECATED RDW RBC AUTO: 40.5 FL (ref 37–54)
EGFRCR SERPLBLD CKD-EPI 2021: 100.6 ML/MIN/1.73
EOSINOPHIL # BLD AUTO: 0.14 10*3/MM3 (ref 0–0.4)
EOSINOPHIL NFR BLD AUTO: 2.5 % (ref 0.3–6.2)
ERYTHROCYTE [DISTWIDTH] IN BLOOD BY AUTOMATED COUNT: 12.4 % (ref 12.3–15.4)
GLOBULIN UR ELPH-MCNC: 2.9 GM/DL
GLUCOSE SERPL-MCNC: 110 MG/DL (ref 65–99)
HCT VFR BLD AUTO: 41 % (ref 37.5–51)
HGB BLD-MCNC: 13.9 G/DL (ref 13–17.7)
IMM GRANULOCYTES # BLD AUTO: 0.02 10*3/MM3 (ref 0–0.05)
IMM GRANULOCYTES NFR BLD AUTO: 0.4 % (ref 0–0.5)
INR PPP: 0.99 (ref 0.9–1.1)
LYMPHOCYTES # BLD AUTO: 1.51 10*3/MM3 (ref 0.7–3.1)
LYMPHOCYTES NFR BLD AUTO: 27.1 % (ref 19.6–45.3)
MCH RBC QN AUTO: 30.1 PG (ref 26.6–33)
MCHC RBC AUTO-ENTMCNC: 33.9 G/DL (ref 31.5–35.7)
MCV RBC AUTO: 88.7 FL (ref 79–97)
MONOCYTES # BLD AUTO: 0.48 10*3/MM3 (ref 0.1–0.9)
MONOCYTES NFR BLD AUTO: 8.6 % (ref 5–12)
NEUTROPHILS NFR BLD AUTO: 3.38 10*3/MM3 (ref 1.7–7)
NEUTROPHILS NFR BLD AUTO: 60.7 % (ref 42.7–76)
NRBC BLD AUTO-RTO: 0 /100 WBC (ref 0–0.2)
PLATELET # BLD AUTO: 361 10*3/MM3 (ref 140–450)
PMV BLD AUTO: 9.6 FL (ref 6–12)
POTASSIUM SERPL-SCNC: 3.9 MMOL/L (ref 3.5–5.2)
PROT SERPL-MCNC: 7 G/DL (ref 6–8.5)
PROTHROMBIN TIME: 13.2 SECONDS (ref 11.7–14.2)
QT INTERVAL: 386 MS
QTC INTERVAL: 423 MS
RBC # BLD AUTO: 4.62 10*6/MM3 (ref 4.14–5.8)
RH BLD: POSITIVE
SODIUM SERPL-SCNC: 139 MMOL/L (ref 136–145)
T&S EXPIRATION DATE: NORMAL
WBC NRBC COR # BLD AUTO: 5.57 10*3/MM3 (ref 3.4–10.8)

## 2024-03-04 PROCEDURE — 85730 THROMBOPLASTIN TIME PARTIAL: CPT

## 2024-03-04 PROCEDURE — 86900 BLOOD TYPING SEROLOGIC ABO: CPT

## 2024-03-04 PROCEDURE — 85025 COMPLETE CBC W/AUTO DIFF WBC: CPT

## 2024-03-04 PROCEDURE — 85610 PROTHROMBIN TIME: CPT

## 2024-03-04 PROCEDURE — 86850 RBC ANTIBODY SCREEN: CPT

## 2024-03-04 PROCEDURE — 93010 ELECTROCARDIOGRAM REPORT: CPT | Performed by: STUDENT IN AN ORGANIZED HEALTH CARE EDUCATION/TRAINING PROGRAM

## 2024-03-04 PROCEDURE — 80053 COMPREHEN METABOLIC PANEL: CPT

## 2024-03-04 PROCEDURE — 36415 COLL VENOUS BLD VENIPUNCTURE: CPT

## 2024-03-04 PROCEDURE — 86901 BLOOD TYPING SEROLOGIC RH(D): CPT

## 2024-03-04 PROCEDURE — 93005 ELECTROCARDIOGRAM TRACING: CPT

## 2024-03-04 NOTE — DISCHARGE INSTRUCTIONS
Take the following medications the morning of surgery:  GABAPENTIN, PRILOSEC, CYMBALTA, HYDROCODONE (AS NEEDED)    ARRIVE FOR SURGERY AT 5:30 AM      If you are on prescription narcotic pain medication to control your pain you may also take that medication the morning of surgery.    General Instructions:  Do not eat solid food after midnight the night before surgery.  You may drink clear liquids day of surgery but must stop at least one hour before your hospital arrival time.  It is beneficial for you to have a clear drink that contains carbohydrates the day of surgery.  We suggest a 12 to 20 ounce bottle of Gatorade or Powerade for non-diabetic patients or a 12 to 20 ounce bottle of G2 or Powerade Zero for diabetic patients. (Pediatric patients, are not advised to drink a 12 to 20 ounce carbohydrate drink)    Clear liquids are liquids you can see through.  Nothing red in color.     Plain water                               Sports drinks  Sodas                                   Gelatin (Jell-O)  Fruit juices without pulp such as white grape juice and apple juice  Popsicles that contain no fruit or yogurt  Tea or coffee (no cream or milk added)  Gatorade / Powerade  G2 / Powerade Zero    Infants may have breast milk up to four hours before surgery.  Infants drinking formula may drink formula up to six hours before surgery.   Patients who avoid smoking, chewing tobacco and alcohol for 4 weeks prior to surgery have a reduced risk of post-operative complications.  Quit smoking as many days before surgery as you can.  Do not smoke, use chewing tobacco or drink alcohol the day of surgery.   If applicable bring your C-PAP/ BI-PAP machine in with you to preop day of surgery.  Bring any papers given to you in the doctor’s office.  Wear clean comfortable clothes.  Do not wear contact lenses, false eyelashes or make-up.  Bring a case for your glasses.   Bring crutches or walker if applicable.  Remove all piercings.  Leave  jewelry and any other valuables at home.  Hair extensions with metal clips must be removed prior to surgery.  The Pre-Admission Testing nurse will instruct you to bring medications if unable to obtain an accurate list in Pre-Admission Testing.        If you were given a blood bank ID arm band remember to bring it with you the day of surgery.    Preventing a Surgical Site Infection:  For 2 to 3 days before surgery, avoid shaving with a razor because the razor can irritate skin and make it easier to develop an infection.    Any areas of open skin can increase the risk of a post-operative wound infection by allowing bacteria to enter and travel throughout the body.  Notify your surgeon if you have any skin wounds / rashes even if it is not near the expected surgical site.  The area will need assessed to determine if surgery should be delayed until it is healed.  The night prior to surgery shower using a fresh bar of anti-bacterial soap (such as Dial) and clean washcloth.  Sleep in a clean bed with clean clothing.  Do not allow pets to sleep with you.  Shower on the morning of surgery using a fresh bar of anti-bacterial soap (such as Dial) and clean washcloth.  Dry with a clean towel and dress in clean clothing.  Ask your surgeon if you will be receiving antibiotics prior to surgery.  Make sure you, your family, and all healthcare providers clean their hands with soap and water or an alcohol based hand  before caring for you or your wound.      CHLORHEXIDINE CLOTH INSTRUCTIONS  The morning of surgery follow these instructions using the Chlorhexidine cloths you've been given.  These steps reduce bacteria on the body.  Do not use the cloths near your eyes, ears mouth, genitalia or on open wounds.  Throw the cloths away after use but do not try to flush them down a toilet.      Open and remove one cloth at a time from the package.    Leave the cloth unfolded and begin the bathing.  Massage the skin with the cloths  using gentle pressure to remove bacteria.  Do not scrub harshly.   Follow the steps below with one 2% CHG cloth per area (6 total cloths).  One cloth for neck, shoulders and chest.  One cloth for both arms, hands, fingers and underarms (do underarms last).  One cloth for the abdomen followed by groin.  One cloth for right leg and foot including between the toes.  One cloth for left leg and foot including between the toes.  The last cloth is to be used for the back of the neck, back and buttocks.    Allow the CHG to air dry 3 minutes on the skin which will give it time to work and decrease the chance of irritation.  The skin may feel sticky until it is dry.  Do not rinse with water or any other liquid or you will lose the beneficial effects of the CHG.  If mild skin irritation occurs, do rinse the skin to remove the CHG.  Report this to the nurse at time of admission.  Do not apply lotions, creams, ointments, deodorants or perfumes after using the clothes. Dress in clean clothes before coming to the hospital.      Day of surgery:  Your arrival time is approximately two hours before your scheduled surgery time.  Upon arrival, a Pre-op nurse and Anesthesiologist will review your health history, obtain vital signs, and answer questions you may have.  The only belongings needed at this time will be a list of your home medications and if applicable your C-PAP/BI-PAP machine.  A Pre-op nurse will start an IV and you may receive medication in preparation for surgery, including something to help you relax.     Please be aware that surgery does come with discomfort.  We want to make every effort to control your discomfort so please discuss any uncontrolled symptoms with your nurse.   Your doctor will most likely have prescribed pain medications.      If you are going home after surgery you will receive individualized written care instructions before being discharged.  A responsible adult must drive you to and from the  hospital on the day of your surgery and ideally stay with you through the night.   .  Discharge prescriptions can be filled by the hospital pharmacy during regular pharmacy hours.  If you are having surgery late in the day/evening your prescription may be e-prescribed to your pharmacy.  Please verify your pharmacy hours or chose a 24 hour pharmacy to avoid not having access to your prescription because your pharmacy has closed for the day.    If you are staying overnight following surgery, you will be transported to your hospital room following the recovery period.  [facility] has all private rooms.    If you have any questions please call Pre-Admission Testing at [phone].  Deductibles and co-payments are collected on the day of service. Please be prepared to pay the required co-pay, deductible or deposit on the day of service as defined by your plan.    Call your surgeon immediately if you experience any of the following symptoms:  Sore Throat  Shortness of Breath or difficulty breathing  Cough  Chills  Body soreness or muscle pain  Headache  Fever  New loss of taste or smell  Do not arrive for your surgery ill.  Your procedure will need to be rescheduled to another time.  You will need to call your physician before the day of surgery to avoid any unnecessary exposure to hospital staff as well as other patients.

## 2024-03-05 ENCOUNTER — TELEPHONE (OUTPATIENT)
Dept: NEUROSURGERY | Facility: CLINIC | Age: 44
End: 2024-03-05
Payer: COMMERCIAL

## 2024-03-05 DIAGNOSIS — Z01.818 PREOP TESTING: Primary | ICD-10-CM

## 2024-03-05 NOTE — TELEPHONE ENCOUNTER
Called patient per Nurse. Informed patient that nurse told me that he failed PAT due to taking Phentermine before surgery. He must be off this medicine for 14 days minium before having surgery. Rescheduled surgery to April 8, 2024, which was  next available.

## 2024-03-13 DIAGNOSIS — M43.16 SPONDYLOLISTHESIS OF LUMBAR REGION: Primary | ICD-10-CM

## 2024-03-14 RX ORDER — DULOXETIN HYDROCHLORIDE 20 MG/1
CAPSULE, DELAYED RELEASE ORAL
Qty: 127 CAPSULE | Refills: 0 | Status: SHIPPED | OUTPATIENT
Start: 2024-03-14 | End: 2024-05-20

## 2024-03-22 DIAGNOSIS — M51.16 LUMBAR DISC DISEASE WITH RADICULOPATHY: Primary | ICD-10-CM

## 2024-03-22 DIAGNOSIS — M17.11 PRIMARY OSTEOARTHRITIS OF RIGHT KNEE: ICD-10-CM
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
## 2024-03-28 ENCOUNTER — OFFICE VISIT (OUTPATIENT)
Dept: NEUROSURGERY | Facility: CLINIC | Age: 44
End: 2024-03-28
Payer: COMMERCIAL

## 2024-03-28 ENCOUNTER — PRE-ADMISSION TESTING (OUTPATIENT)
Dept: PREADMISSION TESTING | Facility: HOSPITAL | Age: 44
End: 2024-03-28
Payer: COMMERCIAL

## 2024-03-28 VITALS
HEART RATE: 92 BPM | OXYGEN SATURATION: 98 % | WEIGHT: 273.6 LBS | HEIGHT: 73 IN | RESPIRATION RATE: 18 BRPM | BODY MASS INDEX: 36.26 KG/M2 | SYSTOLIC BLOOD PRESSURE: 110 MMHG | DIASTOLIC BLOOD PRESSURE: 68 MMHG

## 2024-03-28 VITALS
HEART RATE: 99 BPM | DIASTOLIC BLOOD PRESSURE: 94 MMHG | OXYGEN SATURATION: 97 % | TEMPERATURE: 98.3 F | RESPIRATION RATE: 18 BRPM | SYSTOLIC BLOOD PRESSURE: 147 MMHG | WEIGHT: 273.1 LBS | HEIGHT: 73 IN | BODY MASS INDEX: 36.2 KG/M2

## 2024-03-28 DIAGNOSIS — M71.30 SYNOVIAL CYST: ICD-10-CM

## 2024-03-28 DIAGNOSIS — Z01.818 PREOP TESTING: ICD-10-CM

## 2024-03-28 DIAGNOSIS — M43.16 SPONDYLOLISTHESIS OF LUMBAR REGION: Primary | ICD-10-CM

## 2024-03-28 DIAGNOSIS — M51.16 LUMBAR DISC DISEASE WITH RADICULOPATHY: ICD-10-CM

## 2024-03-28 LAB
ABO GROUP BLD: NORMAL
BLD GP AB SCN SERPL QL: NEGATIVE
RH BLD: POSITIVE
T&S EXPIRATION DATE: NORMAL

## 2024-03-28 PROCEDURE — 86901 BLOOD TYPING SEROLOGIC RH(D): CPT

## 2024-03-28 PROCEDURE — 86850 RBC ANTIBODY SCREEN: CPT

## 2024-03-28 PROCEDURE — 36415 COLL VENOUS BLD VENIPUNCTURE: CPT

## 2024-03-28 PROCEDURE — 86900 BLOOD TYPING SEROLOGIC ABO: CPT

## 2024-03-28 NOTE — DISCHARGE INSTRUCTIONS
Take the following medications the morning of surgery: MAY TAKE GABAPENTIN, DULOXETINE, AND OMEPRAZOLE      If you are on prescription narcotic pain medication to control your pain you may also take that medication the morning of surgery.    General Instructions:  Do not eat solid food after midnight the night before surgery.  You may drink clear liquids day of surgery but must stop at least one hour before your hospital arrival time.  It is beneficial for you to have a clear drink that contains carbohydrates the day of surgery.  We suggest a 12 to 20 ounce bottle of Gatorade or Powerade for non-diabetic patients or a 12 to 20 ounce bottle of G2 or Powerade Zero for diabetic patients. (Pediatric patients, are not advised to drink a 12 to 20 ounce carbohydrate drink)    Clear liquids are liquids you can see through.  Nothing red in color.     Plain water                               Sports drinks  Sodas                                   Gelatin (Jell-O)  Fruit juices without pulp such as white grape juice and apple juice  Popsicles that contain no fruit or yogurt  Tea or coffee (no cream or milk added)  Gatorade / Powerade  G2 / Powerade Zero    Infants may have breast milk up to four hours before surgery.  Infants drinking formula may drink formula up to six hours before surgery.   Patients who avoid smoking, chewing tobacco and alcohol for 4 weeks prior to surgery have a reduced risk of post-operative complications.  Quit smoking as many days before surgery as you can.  Do not smoke, use chewing tobacco or drink alcohol the day of surgery.   If applicable bring your C-PAP/ BI-PAP machine in with you to preop day of surgery.  Bring any papers given to you in the doctor’s office.  Wear clean comfortable clothes.  Do not wear contact lenses, false eyelashes or make-up.  Bring a case for your glasses.   Bring crutches or walker if applicable.  Remove all piercings.  Leave jewelry and any other valuables at home.  Hair  extensions with metal clips must be removed prior to surgery.  The Pre-Admission Testing nurse will instruct you to bring medications if unable to obtain an accurate list in Pre-Admission Testing.        If you were given a blood bank ID arm band remember to bring it with you the day of surgery.    Preventing a Surgical Site Infection:  For 2 to 3 days before surgery, avoid shaving with a razor because the razor can irritate skin and make it easier to develop an infection.    Any areas of open skin can increase the risk of a post-operative wound infection by allowing bacteria to enter and travel throughout the body.  Notify your surgeon if you have any skin wounds / rashes even if it is not near the expected surgical site.  The area will need assessed to determine if surgery should be delayed until it is healed.  The night prior to surgery shower using a fresh bar of anti-bacterial soap (such as Dial) and clean washcloth.  Sleep in a clean bed with clean clothing.  Do not allow pets to sleep with you.  Shower on the morning of surgery using a fresh bar of anti-bacterial soap (such as Dial) and clean washcloth.  Dry with a clean towel and dress in clean clothing.  Ask your surgeon if you will be receiving antibiotics prior to surgery.  Make sure you, your family, and all healthcare providers clean their hands with soap and water or an alcohol based hand  before caring for you or your wound.    Day of surgery:  Your arrival time is approximately two hours before your scheduled surgery time.  Upon arrival, a Pre-op nurse and Anesthesiologist will review your health history, obtain vital signs, and answer questions you may have.  The only belongings needed at this time will be a list of your home medications and if applicable your C-PAP/BI-PAP machine.  A Pre-op nurse will start an IV and you may receive medication in preparation for surgery, including something to help you relax.     Please be aware that  surgery does come with discomfort.  We want to make every effort to control your discomfort so please discuss any uncontrolled symptoms with your nurse.   Your doctor will most likely have prescribed pain medications.      If you are going home after surgery you will receive individualized written care instructions before being discharged.  A responsible adult must drive you to and from the hospital on the day of your surgery and ideally stay with you through the night.   .  Discharge prescriptions can be filled by the hospital pharmacy during regular pharmacy hours.  If you are having surgery late in the day/evening your prescription may be e-prescribed to your pharmacy.  Please verify your pharmacy hours or chose a 24 hour pharmacy to avoid not having access to your prescription because your pharmacy has closed for the day.    If you are staying overnight following surgery, you will be transported to your hospital room following the recovery period.  [facility] has all private rooms.    If you have any questions please call Pre-Admission Testing at [phone].  Deductibles and co-payments are collected on the day of service. Please be prepared to pay the required co-pay, deductible or deposit on the day of service as defined by your plan.    Call your surgeon immediately if you experience any of the following symptoms:  Sore Throat  Shortness of Breath or difficulty breathing  Cough  Chills  Body soreness or muscle pain  Headache  Fever  New loss of taste or smell  Do not arrive for your surgery ill.  Your procedure will need to be rescheduled to another time.  You will need to call your physician before the day of surgery to avoid any unnecessary exposure to hospital staff as well as other patients.  CHLORHEXIDINE CLOTH INSTRUCTIONS  The morning of surgery follow these instructions using the Chlorhexidine cloths you've been given.  These steps reduce bacteria on the body.  Do not use the cloths  near your eyes, ears mouth, genitalia or on open wounds.  Throw the cloths away after use but do not try to flush them down a toilet.      Open and remove one cloth at a time from the package.    Leave the cloth unfolded and begin the bathing.  Massage the skin with the cloths using gentle pressure to remove bacteria.  Do not scrub harshly.   Follow the steps below with one 2% CHG cloth per area (6 total cloths).  One cloth for neck, shoulders and chest.  One cloth for both arms, hands, fingers and underarms (do underarms last).  One cloth for the abdomen followed by groin.  One cloth for right leg and foot including between the toes.  One cloth for left leg and foot including between the toes.  The last cloth is to be used for the back of the neck, back and buttocks.    Allow the CHG to air dry 3 minutes on the skin which will give it time to work and decrease the chance of irritation.  The skin may feel sticky until it is dry.  Do not rinse with water or any other liquid or you will lose the beneficial effects of the CHG.  If mild skin irritation occurs, do rinse the skin to remove the CHG.  Report this to the nurse at time of admission.  Do not apply lotions, creams, ointments, deodorants or perfumes after using the clothes. Dress in clean clothes before coming to the hospital.

## 2024-03-29 ENCOUNTER — TELEPHONE (OUTPATIENT)
Dept: NEUROSURGERY | Facility: CLINIC | Age: 44
End: 2024-03-29
Payer: COMMERCIAL

## 2024-03-29 NOTE — TELEPHONE ENCOUNTER
Called and left  for Ale to call back with her  so I could go to her chart to get her FMLA paperwork changed for her husbands upcoming surgery.

## 2024-04-08 ENCOUNTER — ANESTHESIA EVENT (OUTPATIENT)
Dept: PERIOP | Facility: HOSPITAL | Age: 44
End: 2024-04-08
Payer: COMMERCIAL

## 2024-04-08 ENCOUNTER — HOSPITAL ENCOUNTER (OUTPATIENT)
Facility: HOSPITAL | Age: 44
Discharge: HOME | End: 2024-04-09
Attending: NEUROLOGICAL SURGERY | Admitting: NEUROLOGICAL SURGERY
Payer: COMMERCIAL

## 2024-04-08 ENCOUNTER — APPOINTMENT (OUTPATIENT)
Dept: GENERAL RADIOLOGY | Facility: HOSPITAL | Age: 44
End: 2024-04-08
Payer: COMMERCIAL

## 2024-04-08 ENCOUNTER — ANESTHESIA (OUTPATIENT)
Dept: PERIOP | Facility: HOSPITAL | Age: 44
End: 2024-04-08
Payer: COMMERCIAL

## 2024-04-08 DIAGNOSIS — M43.16 SPONDYLOLISTHESIS OF LUMBAR REGION: ICD-10-CM

## 2024-04-08 DIAGNOSIS — M51.16 LUMBAR DISC DISEASE WITH RADICULOPATHY: ICD-10-CM

## 2024-04-08 DIAGNOSIS — M71.30 SYNOVIAL CYST: Primary | ICD-10-CM

## 2024-04-08 PROCEDURE — 25010000002 GLYCOPYRROLATE 0.2 MG/ML SOLUTION: Performed by: ANESTHESIOLOGY

## 2024-04-08 PROCEDURE — G0378 HOSPITAL OBSERVATION PER HR: HCPCS

## 2024-04-08 PROCEDURE — C1713 ANCHOR/SCREW BN/BN,TIS/BN: HCPCS | Performed by: NEUROLOGICAL SURGERY

## 2024-04-08 PROCEDURE — 25810000003 LACTATED RINGERS PER 1000 ML: Performed by: ANESTHESIOLOGY

## 2024-04-08 PROCEDURE — 22633 ARTHRD CMBN 1NTRSPC LUMBAR: CPT

## 2024-04-08 PROCEDURE — 63052 LAM FACETC/FRMT ARTHRD LUM 1: CPT

## 2024-04-08 PROCEDURE — 25010000002 HYDROMORPHONE PER 4 MG: Performed by: NEUROLOGICAL SURGERY

## 2024-04-08 PROCEDURE — 76000 FLUOROSCOPY <1 HR PHYS/QHP: CPT

## 2024-04-08 PROCEDURE — 22840 INSERT SPINE FIXATION DEVICE: CPT

## 2024-04-08 PROCEDURE — 25010000002 FENTANYL CITRATE (PF) 50 MCG/ML SOLUTION

## 2024-04-08 PROCEDURE — 22633 ARTHRD CMBN 1NTRSPC LUMBAR: CPT | Performed by: NEUROLOGICAL SURGERY

## 2024-04-08 PROCEDURE — 25010000002 PROPOFOL 10 MG/ML EMULSION

## 2024-04-08 PROCEDURE — 25010000002 SUGAMMADEX 200 MG/2ML SOLUTION

## 2024-04-08 PROCEDURE — 25010000002 CEFAZOLIN 3 G RECONSTITUTED SOLUTION 1 EACH VIAL: Performed by: NEUROLOGICAL SURGERY

## 2024-04-08 PROCEDURE — 25010000002 METHOCARBAMOL 1000 MG/10ML SOLUTION: Performed by: NEUROLOGICAL SURGERY

## 2024-04-08 PROCEDURE — 22853 INSJ BIOMECHANICAL DEVICE: CPT

## 2024-04-08 PROCEDURE — 25010000002 HYDROMORPHONE PER 4 MG

## 2024-04-08 PROCEDURE — 25010000002 DEXAMETHASONE SODIUM PHOSPHATE 20 MG/5ML SOLUTION

## 2024-04-08 PROCEDURE — 22853 INSJ BIOMECHANICAL DEVICE: CPT | Performed by: NEUROLOGICAL SURGERY

## 2024-04-08 PROCEDURE — 61783 SCAN PROC SPINAL: CPT | Performed by: NEUROLOGICAL SURGERY

## 2024-04-08 PROCEDURE — 25010000002 FENTANYL CITRATE (PF) 50 MCG/ML SOLUTION: Performed by: ANESTHESIOLOGY

## 2024-04-08 PROCEDURE — 25010000002 ONDANSETRON PER 1 MG

## 2024-04-08 PROCEDURE — 22840 INSERT SPINE FIXATION DEVICE: CPT | Performed by: NEUROLOGICAL SURGERY

## 2024-04-08 PROCEDURE — 63052 LAM FACETC/FRMT ARTHRD LUM 1: CPT | Performed by: NEUROLOGICAL SURGERY

## 2024-04-08 PROCEDURE — C1769 GUIDE WIRE: HCPCS | Performed by: NEUROLOGICAL SURGERY

## 2024-04-08 DEVICE — SPACER 6068116 CATALYFT PL LONG 11MM
Type: IMPLANTABLE DEVICE | Site: SPINE LUMBAR | Status: FUNCTIONAL
Brand: CATALYFT PL EXPANDABLE INTERBODY SYSTEM

## 2024-04-08 DEVICE — FLOSEAL WITH RECOTHROM - 10ML.
Type: IMPLANTABLE DEVICE | Site: SPINE LUMBAR | Status: FUNCTIONAL
Brand: FLOSEAL HEMOSTATIC MATRIX

## 2024-04-08 DEVICE — MAGNETOS FLEX MATRIX  5.04CC 0.25-1MM MEDIUM
Type: IMPLANTABLE DEVICE | Site: SPINE LUMBAR | Status: FUNCTIONAL
Brand: MAGNETOS

## 2024-04-08 DEVICE — ROD 641000035 35MM PERC ROD 4.75MM CCM
Type: IMPLANTABLE DEVICE | Site: SPINE LUMBAR | Status: FUNCTIONAL
Brand: CD HORIZON® SOLERA® SPINAL SYSTEM

## 2024-04-08 DEVICE — HEMOST ABS SURGIFOAM SZ100 8X12 10MM: Type: IMPLANTABLE DEVICE | Site: SPINE LUMBAR | Status: FUNCTIONAL

## 2024-04-08 DEVICE — SCRW ST SOLERA VOYAGER BRKOFF TI 4.75MM: Type: IMPLANTABLE DEVICE | Site: SPINE LUMBAR | Status: FUNCTIONAL

## 2024-04-08 DEVICE — MEDLINE BONEWAX YELLOW
Type: IMPLANTABLE DEVICE | Site: SPINE LUMBAR | Status: FUNCTIONAL
Brand: MEDLINE BONEWAX YELLOW

## 2024-04-08 RX ORDER — OXYCODONE HYDROCHLORIDE 5 MG/1
5 TABLET ORAL EVERY 4 HOURS PRN
Status: DISCONTINUED | OUTPATIENT
Start: 2024-04-08 | End: 2024-04-09 | Stop reason: HOSPADM

## 2024-04-08 RX ORDER — KETAMINE HCL IN NACL, ISO-OSM 100MG/10ML
SYRINGE (ML) INJECTION AS NEEDED
Status: DISCONTINUED | OUTPATIENT
Start: 2024-04-08 | End: 2024-04-08 | Stop reason: SURG

## 2024-04-08 RX ORDER — HYDRALAZINE HYDROCHLORIDE 20 MG/ML
5 INJECTION INTRAMUSCULAR; INTRAVENOUS
Status: DISCONTINUED | OUTPATIENT
Start: 2024-04-08 | End: 2024-04-08 | Stop reason: HOSPADM

## 2024-04-08 RX ORDER — LIDOCAINE HYDROCHLORIDE 20 MG/ML
INJECTION, SOLUTION INFILTRATION; PERINEURAL AS NEEDED
Status: DISCONTINUED | OUTPATIENT
Start: 2024-04-08 | End: 2024-04-08 | Stop reason: SURG

## 2024-04-08 RX ORDER — FENTANYL CITRATE 50 UG/ML
50 INJECTION, SOLUTION INTRAMUSCULAR; INTRAVENOUS
Status: DISCONTINUED | OUTPATIENT
Start: 2024-04-08 | End: 2024-04-08 | Stop reason: HOSPADM

## 2024-04-08 RX ORDER — HYDROCHLOROTHIAZIDE 25 MG/1
25 TABLET ORAL DAILY
Status: DISCONTINUED | OUTPATIENT
Start: 2024-04-08 | End: 2024-04-09 | Stop reason: HOSPADM

## 2024-04-08 RX ORDER — FAMOTIDINE 10 MG/ML
20 INJECTION, SOLUTION INTRAVENOUS ONCE
Status: COMPLETED | OUTPATIENT
Start: 2024-04-08 | End: 2024-04-08

## 2024-04-08 RX ORDER — SODIUM CHLORIDE 0.9 % (FLUSH) 0.9 %
10 SYRINGE (ML) INJECTION AS NEEDED
Status: DISCONTINUED | OUTPATIENT
Start: 2024-04-08 | End: 2024-04-09 | Stop reason: HOSPADM

## 2024-04-08 RX ORDER — PROMETHAZINE HYDROCHLORIDE 25 MG/1
25 SUPPOSITORY RECTAL ONCE AS NEEDED
Status: DISCONTINUED | OUTPATIENT
Start: 2024-04-08 | End: 2024-04-08 | Stop reason: HOSPADM

## 2024-04-08 RX ORDER — ONDANSETRON 2 MG/ML
INJECTION INTRAMUSCULAR; INTRAVENOUS AS NEEDED
Status: DISCONTINUED | OUTPATIENT
Start: 2024-04-08 | End: 2024-04-08 | Stop reason: SURG

## 2024-04-08 RX ORDER — METHOCARBAMOL 100 MG/ML
1000 INJECTION, SOLUTION INTRAMUSCULAR; INTRAVENOUS ONCE
Status: COMPLETED | OUTPATIENT
Start: 2024-04-08 | End: 2024-04-08

## 2024-04-08 RX ORDER — LISINOPRIL 10 MG/1
10 TABLET ORAL DAILY
Status: DISCONTINUED | OUTPATIENT
Start: 2024-04-08 | End: 2024-04-09 | Stop reason: HOSPADM

## 2024-04-08 RX ORDER — DROPERIDOL 2.5 MG/ML
0.62 INJECTION, SOLUTION INTRAMUSCULAR; INTRAVENOUS
Status: DISCONTINUED | OUTPATIENT
Start: 2024-04-08 | End: 2024-04-08 | Stop reason: HOSPADM

## 2024-04-08 RX ORDER — HYDROMORPHONE HYDROCHLORIDE 1 MG/ML
0.5 INJECTION, SOLUTION INTRAMUSCULAR; INTRAVENOUS; SUBCUTANEOUS
Status: DISCONTINUED | OUTPATIENT
Start: 2024-04-08 | End: 2024-04-08 | Stop reason: HOSPADM

## 2024-04-08 RX ORDER — MAGNESIUM HYDROXIDE 1200 MG/15ML
LIQUID ORAL AS NEEDED
Status: DISCONTINUED | OUTPATIENT
Start: 2024-04-08 | End: 2024-04-08 | Stop reason: HOSPADM

## 2024-04-08 RX ORDER — ONDANSETRON 2 MG/ML
4 INJECTION INTRAMUSCULAR; INTRAVENOUS ONCE AS NEEDED
Status: DISCONTINUED | OUTPATIENT
Start: 2024-04-08 | End: 2024-04-08 | Stop reason: HOSPADM

## 2024-04-08 RX ORDER — SODIUM CHLORIDE 0.9 % (FLUSH) 0.9 %
3 SYRINGE (ML) INJECTION EVERY 12 HOURS SCHEDULED
Status: DISCONTINUED | OUTPATIENT
Start: 2024-04-08 | End: 2024-04-08 | Stop reason: HOSPADM

## 2024-04-08 RX ORDER — DIPHENHYDRAMINE HYDROCHLORIDE 50 MG/ML
12.5 INJECTION INTRAMUSCULAR; INTRAVENOUS
Status: DISCONTINUED | OUTPATIENT
Start: 2024-04-08 | End: 2024-04-08 | Stop reason: HOSPADM

## 2024-04-08 RX ORDER — FENTANYL CITRATE 50 UG/ML
50 INJECTION, SOLUTION INTRAMUSCULAR; INTRAVENOUS ONCE AS NEEDED
Status: COMPLETED | OUTPATIENT
Start: 2024-04-08 | End: 2024-04-08

## 2024-04-08 RX ORDER — HYDROCODONE BITARTRATE AND ACETAMINOPHEN 5; 325 MG/1; MG/1
1 TABLET ORAL ONCE AS NEEDED
Status: DISCONTINUED | OUTPATIENT
Start: 2024-04-08 | End: 2024-04-08 | Stop reason: HOSPADM

## 2024-04-08 RX ORDER — EPHEDRINE SULFATE 50 MG/ML
5 INJECTION, SOLUTION INTRAVENOUS ONCE AS NEEDED
Status: DISCONTINUED | OUTPATIENT
Start: 2024-04-08 | End: 2024-04-08 | Stop reason: HOSPADM

## 2024-04-08 RX ORDER — MIDAZOLAM HYDROCHLORIDE 1 MG/ML
1 INJECTION INTRAMUSCULAR; INTRAVENOUS
Status: DISCONTINUED | OUTPATIENT
Start: 2024-04-08 | End: 2024-04-08 | Stop reason: HOSPADM

## 2024-04-08 RX ORDER — LIDOCAINE HYDROCHLORIDE 10 MG/ML
0.5 INJECTION, SOLUTION INFILTRATION; PERINEURAL ONCE AS NEEDED
Status: DISCONTINUED | OUTPATIENT
Start: 2024-04-08 | End: 2024-04-08 | Stop reason: HOSPADM

## 2024-04-08 RX ORDER — BUPIVACAINE HYDROCHLORIDE AND EPINEPHRINE 5; 5 MG/ML; UG/ML
INJECTION, SOLUTION EPIDURAL; INTRACAUDAL; PERINEURAL AS NEEDED
Status: DISCONTINUED | OUTPATIENT
Start: 2024-04-08 | End: 2024-04-08 | Stop reason: HOSPADM

## 2024-04-08 RX ORDER — PHENYLEPHRINE HCL IN 0.9% NACL 1 MG/10 ML
SYRINGE (ML) INTRAVENOUS AS NEEDED
Status: DISCONTINUED | OUTPATIENT
Start: 2024-04-08 | End: 2024-04-08 | Stop reason: SURG

## 2024-04-08 RX ORDER — PROMETHAZINE HYDROCHLORIDE 25 MG/1
25 TABLET ORAL ONCE AS NEEDED
Status: DISCONTINUED | OUTPATIENT
Start: 2024-04-08 | End: 2024-04-08 | Stop reason: HOSPADM

## 2024-04-08 RX ORDER — ONDANSETRON 2 MG/ML
4 INJECTION INTRAMUSCULAR; INTRAVENOUS EVERY 6 HOURS PRN
Status: DISCONTINUED | OUTPATIENT
Start: 2024-04-08 | End: 2024-04-09 | Stop reason: HOSPADM

## 2024-04-08 RX ORDER — NALOXONE HCL 0.4 MG/ML
0.2 VIAL (ML) INJECTION AS NEEDED
Status: DISCONTINUED | OUTPATIENT
Start: 2024-04-08 | End: 2024-04-08 | Stop reason: HOSPADM

## 2024-04-08 RX ORDER — DULOXETIN HYDROCHLORIDE 20 MG/1
20 CAPSULE, DELAYED RELEASE ORAL 2 TIMES DAILY
Status: DISCONTINUED | OUTPATIENT
Start: 2024-04-08 | End: 2024-04-09 | Stop reason: HOSPADM

## 2024-04-08 RX ORDER — PANTOPRAZOLE SODIUM 40 MG/1
40 TABLET, DELAYED RELEASE ORAL
Status: DISCONTINUED | OUTPATIENT
Start: 2024-04-09 | End: 2024-04-09 | Stop reason: HOSPADM

## 2024-04-08 RX ORDER — AMOXICILLIN 250 MG
1 CAPSULE ORAL 2 TIMES DAILY
Status: DISCONTINUED | OUTPATIENT
Start: 2024-04-08 | End: 2024-04-09 | Stop reason: HOSPADM

## 2024-04-08 RX ORDER — GABAPENTIN 300 MG/1
300 CAPSULE ORAL 3 TIMES DAILY
Status: DISCONTINUED | OUTPATIENT
Start: 2024-04-08 | End: 2024-04-09 | Stop reason: HOSPADM

## 2024-04-08 RX ORDER — LABETALOL HYDROCHLORIDE 5 MG/ML
5 INJECTION, SOLUTION INTRAVENOUS
Status: DISCONTINUED | OUTPATIENT
Start: 2024-04-08 | End: 2024-04-08 | Stop reason: HOSPADM

## 2024-04-08 RX ORDER — ROCURONIUM BROMIDE 10 MG/ML
INJECTION, SOLUTION INTRAVENOUS AS NEEDED
Status: DISCONTINUED | OUTPATIENT
Start: 2024-04-08 | End: 2024-04-08 | Stop reason: SURG

## 2024-04-08 RX ORDER — ACETAMINOPHEN 325 MG/1
650 TABLET ORAL
Status: DISCONTINUED | OUTPATIENT
Start: 2024-04-08 | End: 2024-04-09 | Stop reason: HOSPADM

## 2024-04-08 RX ORDER — OXYCODONE AND ACETAMINOPHEN 7.5; 325 MG/1; MG/1
1 TABLET ORAL EVERY 4 HOURS PRN
Status: DISCONTINUED | OUTPATIENT
Start: 2024-04-08 | End: 2024-04-08 | Stop reason: HOSPADM

## 2024-04-08 RX ORDER — SODIUM CHLORIDE, SODIUM LACTATE, POTASSIUM CHLORIDE, CALCIUM CHLORIDE 600; 310; 30; 20 MG/100ML; MG/100ML; MG/100ML; MG/100ML
9 INJECTION, SOLUTION INTRAVENOUS CONTINUOUS
Status: DISCONTINUED | OUTPATIENT
Start: 2024-04-08 | End: 2024-04-09 | Stop reason: HOSPADM

## 2024-04-08 RX ORDER — GLYCOPYRROLATE 0.2 MG/ML
0.2 INJECTION INTRAMUSCULAR; INTRAVENOUS
Status: COMPLETED | OUTPATIENT
Start: 2024-04-08 | End: 2024-04-08

## 2024-04-08 RX ORDER — PROPOFOL 10 MG/ML
VIAL (ML) INTRAVENOUS AS NEEDED
Status: DISCONTINUED | OUTPATIENT
Start: 2024-04-08 | End: 2024-04-08 | Stop reason: SURG

## 2024-04-08 RX ORDER — ONDANSETRON 4 MG/1
4 TABLET, ORALLY DISINTEGRATING ORAL EVERY 6 HOURS PRN
Status: DISCONTINUED | OUTPATIENT
Start: 2024-04-08 | End: 2024-04-09 | Stop reason: HOSPADM

## 2024-04-08 RX ORDER — HYDROMORPHONE HYDROCHLORIDE 1 MG/ML
0.5 INJECTION, SOLUTION INTRAMUSCULAR; INTRAVENOUS; SUBCUTANEOUS
Status: DISCONTINUED | OUTPATIENT
Start: 2024-04-08 | End: 2024-04-09 | Stop reason: HOSPADM

## 2024-04-08 RX ORDER — DEXAMETHASONE SODIUM PHOSPHATE 4 MG/ML
INJECTION, SOLUTION INTRA-ARTICULAR; INTRALESIONAL; INTRAMUSCULAR; INTRAVENOUS; SOFT TISSUE AS NEEDED
Status: DISCONTINUED | OUTPATIENT
Start: 2024-04-08 | End: 2024-04-08 | Stop reason: SURG

## 2024-04-08 RX ORDER — TOPIRAMATE 25 MG/1
25 TABLET ORAL DAILY
Status: DISCONTINUED | OUTPATIENT
Start: 2024-04-08 | End: 2024-04-09 | Stop reason: HOSPADM

## 2024-04-08 RX ORDER — SODIUM CHLORIDE 0.9 % (FLUSH) 0.9 %
3-10 SYRINGE (ML) INJECTION AS NEEDED
Status: DISCONTINUED | OUTPATIENT
Start: 2024-04-08 | End: 2024-04-08 | Stop reason: HOSPADM

## 2024-04-08 RX ORDER — SODIUM CHLORIDE 9 MG/ML
40 INJECTION, SOLUTION INTRAVENOUS AS NEEDED
Status: DISCONTINUED | OUTPATIENT
Start: 2024-04-08 | End: 2024-04-09 | Stop reason: HOSPADM

## 2024-04-08 RX ORDER — IPRATROPIUM BROMIDE AND ALBUTEROL SULFATE 2.5; .5 MG/3ML; MG/3ML
3 SOLUTION RESPIRATORY (INHALATION) ONCE AS NEEDED
Status: DISCONTINUED | OUTPATIENT
Start: 2024-04-08 | End: 2024-04-08 | Stop reason: HOSPADM

## 2024-04-08 RX ORDER — ENOXAPARIN SODIUM 100 MG/ML
40 INJECTION SUBCUTANEOUS NIGHTLY
Status: DISCONTINUED | OUTPATIENT
Start: 2024-04-09 | End: 2024-04-09 | Stop reason: HOSPADM

## 2024-04-08 RX ORDER — NALOXONE HCL 0.4 MG/ML
0.4 VIAL (ML) INJECTION
Status: DISCONTINUED | OUTPATIENT
Start: 2024-04-08 | End: 2024-04-09 | Stop reason: HOSPADM

## 2024-04-08 RX ORDER — SODIUM CHLORIDE 0.9 % (FLUSH) 0.9 %
10 SYRINGE (ML) INJECTION EVERY 12 HOURS SCHEDULED
Status: DISCONTINUED | OUTPATIENT
Start: 2024-04-08 | End: 2024-04-09 | Stop reason: HOSPADM

## 2024-04-08 RX ORDER — FLUMAZENIL 0.1 MG/ML
0.2 INJECTION INTRAVENOUS AS NEEDED
Status: DISCONTINUED | OUTPATIENT
Start: 2024-04-08 | End: 2024-04-08 | Stop reason: HOSPADM

## 2024-04-08 RX ORDER — METHOCARBAMOL 500 MG/1
1000 TABLET, FILM COATED ORAL 4 TIMES DAILY PRN
Status: DISCONTINUED | OUTPATIENT
Start: 2024-04-08 | End: 2024-04-09 | Stop reason: HOSPADM

## 2024-04-08 RX ADMIN — FENTANYL CITRATE 50 MCG: 50 INJECTION, SOLUTION INTRAMUSCULAR; INTRAVENOUS at 13:34

## 2024-04-08 RX ADMIN — SODIUM CHLORIDE 3000 MG: 900 INJECTION INTRAVENOUS at 18:16

## 2024-04-08 RX ADMIN — SODIUM CHLORIDE 40 ML: 900 INJECTION INTRAVENOUS at 18:15

## 2024-04-08 RX ADMIN — METHOCARBAMOL 1000 MG: 100 INJECTION INTRAMUSCULAR; INTRAVENOUS at 14:41

## 2024-04-08 RX ADMIN — HYDROMORPHONE HYDROCHLORIDE 0.5 MG: 1 INJECTION, SOLUTION INTRAMUSCULAR; INTRAVENOUS; SUBCUTANEOUS at 14:41

## 2024-04-08 RX ADMIN — ROCURONIUM BROMIDE 30 MG: 10 INJECTION, SOLUTION INTRAVENOUS at 12:28

## 2024-04-08 RX ADMIN — GABAPENTIN 300 MG: 300 CAPSULE ORAL at 16:07

## 2024-04-08 RX ADMIN — METHOCARBAMOL TABLETS 1000 MG: 500 TABLET, COATED ORAL at 20:20

## 2024-04-08 RX ADMIN — SODIUM CHLORIDE, POTASSIUM CHLORIDE, SODIUM LACTATE AND CALCIUM CHLORIDE 9 ML/HR: 600; 310; 30; 20 INJECTION, SOLUTION INTRAVENOUS at 09:36

## 2024-04-08 RX ADMIN — PROPOFOL 200 MG: 10 INJECTION, EMULSION INTRAVENOUS at 10:29

## 2024-04-08 RX ADMIN — LIDOCAINE HYDROCHLORIDE 100 MG: 20 INJECTION, SOLUTION INFILTRATION; PERINEURAL at 10:29

## 2024-04-08 RX ADMIN — OXYCODONE 5 MG: 5 TABLET ORAL at 16:07

## 2024-04-08 RX ADMIN — DOCUSATE SODIUM 50MG AND SENNOSIDES 8.6MG 1 TABLET: 8.6; 5 TABLET, FILM COATED ORAL at 20:19

## 2024-04-08 RX ADMIN — Medication 10 MG: at 11:31

## 2024-04-08 RX ADMIN — Medication 30 MG: at 10:29

## 2024-04-08 RX ADMIN — SUGAMMADEX 200 MG: 100 INJECTION, SOLUTION INTRAVENOUS at 12:51

## 2024-04-08 RX ADMIN — FENTANYL CITRATE 50 MCG: 50 INJECTION, SOLUTION INTRAMUSCULAR; INTRAVENOUS at 14:16

## 2024-04-08 RX ADMIN — ROCURONIUM BROMIDE 30 MG: 10 INJECTION, SOLUTION INTRAVENOUS at 11:06

## 2024-04-08 RX ADMIN — FENTANYL CITRATE 50 MCG: 50 INJECTION, SOLUTION INTRAMUSCULAR; INTRAVENOUS at 13:41

## 2024-04-08 RX ADMIN — HYDROCHLOROTHIAZIDE 25 MG: 25 TABLET ORAL at 18:15

## 2024-04-08 RX ADMIN — Medication 10 MG: at 10:57

## 2024-04-08 RX ADMIN — FAMOTIDINE 20 MG: 10 INJECTION INTRAVENOUS at 09:36

## 2024-04-08 RX ADMIN — FENTANYL CITRATE 50 MCG: 50 INJECTION, SOLUTION INTRAMUSCULAR; INTRAVENOUS at 10:29

## 2024-04-08 RX ADMIN — ONDANSETRON 4 MG: 2 INJECTION INTRAMUSCULAR; INTRAVENOUS at 12:51

## 2024-04-08 RX ADMIN — HYDROMORPHONE HYDROCHLORIDE 0.5 MG: 1 INJECTION, SOLUTION INTRAMUSCULAR; INTRAVENOUS; SUBCUTANEOUS at 15:24

## 2024-04-08 RX ADMIN — SODIUM CHLORIDE, POTASSIUM CHLORIDE, SODIUM LACTATE AND CALCIUM CHLORIDE 9 ML/HR: 600; 310; 30; 20 INJECTION, SOLUTION INTRAVENOUS at 16:23

## 2024-04-08 RX ADMIN — GLYCOPYRROLATE 0.2 MG: 0.2 INJECTION INTRAMUSCULAR; INTRAVENOUS at 09:36

## 2024-04-08 RX ADMIN — Medication 10 ML: at 20:20

## 2024-04-08 RX ADMIN — ACETAMINOPHEN 325MG 650 MG: 325 TABLET ORAL at 16:07

## 2024-04-08 RX ADMIN — DEXAMETHASONE SODIUM PHOSPHATE 8 MG: 4 INJECTION, SOLUTION INTRAMUSCULAR; INTRAVENOUS at 10:56

## 2024-04-08 RX ADMIN — OXYCODONE AND ACETAMINOPHEN 1 TABLET: 7.5; 325 TABLET ORAL at 14:51

## 2024-04-08 RX ADMIN — GABAPENTIN 300 MG: 300 CAPSULE ORAL at 20:20

## 2024-04-08 RX ADMIN — LISINOPRIL 10 MG: 10 TABLET ORAL at 18:15

## 2024-04-08 RX ADMIN — SODIUM CHLORIDE, POTASSIUM CHLORIDE, SODIUM LACTATE AND CALCIUM CHLORIDE: 600; 310; 30; 20 INJECTION, SOLUTION INTRAVENOUS at 11:30

## 2024-04-08 RX ADMIN — SODIUM CHLORIDE 3000 MG: 900 INJECTION INTRAVENOUS at 10:18

## 2024-04-08 RX ADMIN — ROCURONIUM BROMIDE 50 MG: 10 INJECTION, SOLUTION INTRAVENOUS at 10:29

## 2024-04-08 RX ADMIN — ROCURONIUM BROMIDE 20 MG: 10 INJECTION, SOLUTION INTRAVENOUS at 11:44

## 2024-04-08 RX ADMIN — HYDROMORPHONE HYDROCHLORIDE 0.5 MG: 1 INJECTION, SOLUTION INTRAMUSCULAR; INTRAVENOUS; SUBCUTANEOUS at 18:21

## 2024-04-08 RX ADMIN — DULOXETINE HYDROCHLORIDE 20 MG: 20 CAPSULE, DELAYED RELEASE ORAL at 20:20

## 2024-04-08 RX ADMIN — TOPIRAMATE 25 MG: 25 TABLET, FILM COATED ORAL at 18:15

## 2024-04-08 RX ADMIN — OXYCODONE 5 MG: 5 TABLET ORAL at 20:19

## 2024-04-08 RX ADMIN — Medication 100 MCG: at 11:01

## 2024-04-08 NOTE — INTERVAL H&P NOTE
H&P reviewed. The patient was examined and there are no changes to the H&P.      We discussed the risks and benefits and alternatives of surgery including CSF leak, injury to nerve roots, need for further surgery down the line.  Main goal is to decompress his synovial cyst on the right side and to stabilize his spine prevent any further hypomobility.  He understands and is willing to proceed with surgery.

## 2024-04-08 NOTE — OP NOTE
MIS Transforaminal lumbar interbody fusion procedure Note    Mark Ballard  4/8/2024  [id number]    SURGEON  Adolfo Gonzalez MD    Assistant:  Deedee Tatum CSA was present throughout the entire surgery to provide intraoperative suction, retraction, suturing, and irrigation to promote visualization by the operative surgeon and assist with opening and closure.  The skilled assistance was necessary for the success of this case.  Our practice does not have a relationship with neurosurgical residents.    Indication: Mark Ballard is a 43 y.o. male who presents with lumbar radiculopathy secondary to a synovial cyst on the right L4-5 space causing severe stenosis.  These were refractory to epidural steroid injections and physical therapy.  We discussed the risk and benefits and alternatives of surgery including CSF leak, injury to nerve roots, need for further surgery down the line.  He understood and was willing to proceed with surgery.      Pre-op Diagnosis:   Synovial cyst [M71.30]  Lumbar disc disease with radiculopathy [M51.16]    Post-op Diagnosis:     Post-Op Diagnosis Codes:     * Synovial cyst [M71.30]     * Lumbar disc disease with radiculopathy [M51.16]    Procedure Performed:  Procedure(s):  LUMBAR FOUR TO FIVE TRANSFORAMINAL INTERBODY FUSION MINIMALLY INVASIVE NAVIGATION    Spinal Surgery Levels Completed:1 Level    1.  Lumbar Laminectomy, Medial Facetectomy, and Foraminotomy bilaterally at L4-L5 with decompression of the cauda equina and nerve roots    2. Posterior lumbar arthrodesis via transforaminal approach, including removal of the disk for decompression of the spinal nerve roots  L4-L5    3.  Posterior lateral fusion of the decorticated facet joints at  L4-L5    4. Nonsegmental spinal instrumentation  L4-L5    5. Application of intervertebral biomechanical device L4-L5, packed with morselized magnetos allograft    6. Placement of bone magnetos allograft and morselized autograft into the  intervertebral space    7. Stealth O-arm navigation    8.  Resection of synovial cyst    Anesthesia: General    Staff:   Circulator: Nay Handley RN; Luisa Turner RN  Radiology Technologist: Jenny Latif  Scrub Person: Linette Paulino; River Friedman  Vendor Representative: Corey Clifford  Assistant: Deedee Tatum CSA    Estimated Blood Loss: 100ml    Specimens:  * No orders in the log *     Drains: None    Findings: Severe stenosis caused by large firm synovial cyst    Complications: None apparent    Narrative Description:     Positioning:  After proper informed consent, the patient was taken to the OR in stable condition and placed under general anesthesia. Once anesthetized the patient was turned prone on an open Negrito table and was prepped and draped in the usual sterile fashion. A needle was used to localize posterior iliac crest using anatomic landmarks and palpation and then a short stab incision was infiltrated with local anesthesia. A long Medtronic hip pin was placed into the posterior iliac crest and a stealth navigation array was attached to the hip pin. At this point the O-arm was brought into the room and a CT scan was performed on the patient in prone position.    Approach and Posterior Instrumentation: Navigation was used to identify the entry points for percutaneous screw placement and a 3 cm incision was created averaging these percutaneous entry points on the skin.  The skin incision was taken down to the superficial fascia which was then incised with Bovie electrocautery.  A 5.5 mm awl-tipped tap was placed at the pedicle entry zone of the left L4 pedicle and a 6.5 x 50 mm Medtronic Voyager screw was placed at this level.  This was then repeated with 6.5 x 50 mm screws in the bilateral L4 pedicles and bilateral L5 pedicles.    Lumbar decompression: A 7 cm Metrx tube was docked onto the left sided hypertrophic facet joint at L4-5 and the soft tissue was trough was  removed using Bovie cautery.  A Positron drill with a matchstick drill bit was used to remove the hypertrophic facet bone at this level as well as complete a laminectomy towards midline.  Any remaining bone was removed using Kerrison rongeurs and the exiting and traversing nerve roots were palpated at this level to ensure that they were decompressed.  Morselized magnetos allograft was packed into the decorticated facet joint to promote posterior lateral fusion.  Then, a 7 cm Metrx tube was docked onto the right sided hypertrophic facet joint and similarly a facetectomy and laminectomy were performed using the drill and Kerrison rongeurs.  We spent a considerable amount of time dissecting a large and firm synovial cyst off of the lateral aspect of the L5 nerve root and the medial aspect of the L4 nerve root around Kambin's triangle.  This was considerably fused to the dura and required usage of a microdissection knife to excise.  Once we have fully excised this, the decompression was carried out towards midline.  After adequate decompression and ensuring that the exiting and traversing nerve roots on this side were free of impingement, Kambin's triangle was identified.    Arthrodesis: The annulus through Kambin's triangle on the right side was incised using a 15 scalpel.  Disc material was removed using combination of pituitary and curette along with franco of various sizes.  Once adequate discectomy had been performed a 11 mm spacing device was placed in the anterior portion of the vertebral body and visualized under fluoroscopic guidance.  We were satisfied with the amount of height estimated by the spacer and proceeded to pack the disc base with autograft and magnetos allograft.  We then inserted the 11 x 27 mm Catalyft PL cage under direct visualization and fluoroscopic guidance and expanded it to a satisfactory angle.  At this point a 35 mm betsy was placed on the left and a 40 mm betsy was placed on the  right    Closure:  The wound was irrigated and hemostasis was achieved with Flowseal and bipolar electrocautery of the muscle and epidural venous plexus.  No CSF leak was seen. The fascia was closed with 0 Vicryl suture and the superficial subcutaneous tissue closed with 3-0 Vicryl suture interrupted, and the superficial skin was closed with subcuticular 4-0 Monocryl and dressed with Dermabond. No intraoperative complications are recorded and patient was transferred to postop recovery in stable condition.    Adolfo Gonzalez MD     Date: 4/8/2024  Time: 13:02 EDT

## 2024-04-08 NOTE — ANESTHESIA POSTPROCEDURE EVALUATION
Patient: Mark Ballard    Procedure Summary       Date: 04/08/24 Room / Location: [facility] OR [address] [place] [address] MAIN OR    Anesthesia Start: 1022 Anesthesia Stop: 1326    Procedure: LUMBAR FOUR TO FIVE TRANSFORAMINAL INTERBODY FUSION MINIMALLY INVASIVE NAVIGATION (Right: Spine Lumbar) Diagnosis:       Synovial cyst      Lumbar disc disease with radiculopathy      (Synovial cyst [M71.30])      (Lumbar disc disease with radiculopathy [M51.16])    Surgeons: Adolfo Gonzalez MD Provider: Steve Plascencia MD    Anesthesia Type: general ASA Status: 3            Anesthesia Type: general    Vitals  Vitals Value Taken Time   /73 04/08/24 1500   Temp 36.6 °C (97.8 °F) 04/08/24 1500   Pulse 82 04/08/24 1510   Resp 19 04/08/24 1415   SpO2 100 % 04/08/24 1510   Vitals shown include unfiled device data.        Anesthesia Post Evaluation

## 2024-04-08 NOTE — ANESTHESIA PREPROCEDURE EVALUATION
Anesthesia Evaluation     NPO Solid Status: > 8 hours             Airway   Mallampati: II  TM distance: >3 FB  Neck ROM: full  no difficulty expected  Dental - normal exam     Pulmonary - normal exam   Cardiovascular - normal exam    (+) hypertension      Neuro/Psych  GI/Hepatic/Renal/Endo    (+) obesity    Musculoskeletal     Abdominal    Substance History      OB/GYN          Other                    Anesthesia Plan    ASA 3     general     (Semaglutide - last dose 3/15)  intravenous induction     Anesthetic plan, risks, benefits, and alternatives have been provided, discussed and informed consent has been obtained with: patient.    CODE STATUS:

## 2024-04-08 NOTE — ANESTHESIA PROCEDURE NOTES
Airway  Urgency: elective    Date/Time: 4/8/2024 10:34 AM  Airway not difficult    General Information and Staff    Patient location during procedure: OR  Anesthesiologist: Steve Plascencia MD  CRNA/CAA: Brando Frazier CRNA    Indications and Patient Condition  Indications for airway management: airway protection    Preoxygenated: yes  MILS maintained throughout  Mask difficulty assessment: 2 - vent by mask + OA or adjuvant +/- NMBA    Final Airway Details  Final airway type: endotracheal airway      Successful airway: ETT  Cuffed: yes   Successful intubation technique: direct laryngoscopy  Endotracheal tube insertion site: oral  Blade: Alvarez  Blade size: 2  ETT size (mm): 7.5  Cormack-Lehane Classification: grade I - full view of glottis  Placement verified by: chest auscultation and capnometry   Measured from: lips  ETT/EBT  to lips (cm): 21  Number of attempts at approach: 1  Assessment: lips, teeth, and gum same as pre-op and atraumatic intubation

## 2024-04-09 ENCOUNTER — APPOINTMENT (OUTPATIENT)
Dept: GENERAL RADIOLOGY | Facility: HOSPITAL | Age: 44
End: 2024-04-09
Payer: COMMERCIAL

## 2024-04-09 VITALS
TEMPERATURE: 97.9 F | DIASTOLIC BLOOD PRESSURE: 67 MMHG | RESPIRATION RATE: 17 BRPM | HEIGHT: 73 IN | BODY MASS INDEX: 36.2 KG/M2 | SYSTOLIC BLOOD PRESSURE: 94 MMHG | OXYGEN SATURATION: 98 % | WEIGHT: 273.1 LBS | HEART RATE: 77 BPM

## 2024-04-09 LAB
ANION GAP SERPL CALCULATED.3IONS-SCNC: 9.2 MMOL/L (ref 5–15)
BUN SERPL-MCNC: 13 MG/DL (ref 6–20)
BUN/CREAT SERPL: 15.1 (ref 7–25)
CALCIUM SPEC-SCNC: 8.3 MG/DL (ref 8.6–10.5)
CHLORIDE SERPL-SCNC: 104 MMOL/L (ref 98–107)
CO2 SERPL-SCNC: 24.8 MMOL/L (ref 22–29)
CREAT SERPL-MCNC: 0.86 MG/DL (ref 0.76–1.27)
EGFRCR SERPLBLD CKD-EPI 2021: 110.2 ML/MIN/1.73
GLUCOSE SERPL-MCNC: 137 MG/DL (ref 65–99)
POTASSIUM SERPL-SCNC: 3.6 MMOL/L (ref 3.5–5.2)
SODIUM SERPL-SCNC: 138 MMOL/L (ref 136–145)

## 2024-04-09 PROCEDURE — 25010000002 CEFAZOLIN 3 G RECONSTITUTED SOLUTION 1 EACH VIAL: Performed by: NEUROLOGICAL SURGERY

## 2024-04-09 PROCEDURE — G0378 HOSPITAL OBSERVATION PER HR: HCPCS

## 2024-04-09 PROCEDURE — 80048 BASIC METABOLIC PNL TOTAL CA: CPT | Performed by: NEUROLOGICAL SURGERY

## 2024-04-09 PROCEDURE — 97162 PT EVAL MOD COMPLEX 30 MIN: CPT

## 2024-04-09 PROCEDURE — 72100 X-RAY EXAM L-S SPINE 2/3 VWS: CPT

## 2024-04-09 PROCEDURE — 97530 THERAPEUTIC ACTIVITIES: CPT

## 2024-04-09 PROCEDURE — 25010000002 HYDROMORPHONE PER 4 MG: Performed by: NEUROLOGICAL SURGERY

## 2024-04-09 RX ORDER — AMOXICILLIN 250 MG
1 CAPSULE ORAL 2 TIMES DAILY
Qty: 20 TABLET | Refills: 0 | Status: SHIPPED | OUTPATIENT
Start: 2024-04-09 | End: 2024-04-12

## 2024-04-09 RX ORDER — NALOXONE HYDROCHLORIDE 4 MG/.1ML
SPRAY NASAL
Qty: 2 EACH | Refills: 0 | Status: SHIPPED | OUTPATIENT
Start: 2024-04-09 | End: 2024-04-12

## 2024-04-09 RX ORDER — OXYCODONE HYDROCHLORIDE 5 MG/1
5 TABLET ORAL EVERY 4 HOURS PRN
Qty: 18 TABLET | Refills: 0 | Status: SHIPPED | OUTPATIENT
Start: 2024-04-09 | End: 2024-04-11

## 2024-04-09 RX ORDER — METHOCARBAMOL 500 MG/1
1000 TABLET, FILM COATED ORAL 4 TIMES DAILY PRN
Qty: 80 TABLET | Refills: 0 | Status: SHIPPED | OUTPATIENT
Start: 2024-04-09

## 2024-04-09 RX ADMIN — PANTOPRAZOLE SODIUM 40 MG: 40 TABLET, DELAYED RELEASE ORAL at 06:45

## 2024-04-09 RX ADMIN — SODIUM CHLORIDE 3000 MG: 900 INJECTION INTRAVENOUS at 11:13

## 2024-04-09 RX ADMIN — GABAPENTIN 300 MG: 300 CAPSULE ORAL at 08:40

## 2024-04-09 RX ADMIN — SODIUM CHLORIDE 3000 MG: 900 INJECTION INTRAVENOUS at 03:56

## 2024-04-09 RX ADMIN — ACETAMINOPHEN 325MG 650 MG: 325 TABLET ORAL at 08:40

## 2024-04-09 RX ADMIN — OXYCODONE 5 MG: 5 TABLET ORAL at 11:12

## 2024-04-09 RX ADMIN — OXYCODONE 5 MG: 5 TABLET ORAL at 14:45

## 2024-04-09 RX ADMIN — OXYCODONE 5 MG: 5 TABLET ORAL at 06:46

## 2024-04-09 RX ADMIN — ACETAMINOPHEN 325MG 650 MG: 325 TABLET ORAL at 11:12

## 2024-04-09 RX ADMIN — TOPIRAMATE 25 MG: 25 TABLET, FILM COATED ORAL at 08:41

## 2024-04-09 RX ADMIN — HYDROMORPHONE HYDROCHLORIDE 0.5 MG: 1 INJECTION, SOLUTION INTRAMUSCULAR; INTRAVENOUS; SUBCUTANEOUS at 03:57

## 2024-04-09 RX ADMIN — HYDROCHLOROTHIAZIDE 25 MG: 25 TABLET ORAL at 08:41

## 2024-04-09 RX ADMIN — METHOCARBAMOL TABLETS 1000 MG: 500 TABLET, COATED ORAL at 06:45

## 2024-04-09 RX ADMIN — METHOCARBAMOL TABLETS 1000 MG: 500 TABLET, COATED ORAL at 12:51

## 2024-04-09 RX ADMIN — Medication 10 ML: at 08:41

## 2024-04-09 RX ADMIN — DOCUSATE SODIUM 50MG AND SENNOSIDES 8.6MG 1 TABLET: 8.6; 5 TABLET, FILM COATED ORAL at 08:40

## 2024-04-09 RX ADMIN — DULOXETINE HYDROCHLORIDE 20 MG: 20 CAPSULE, DELAYED RELEASE ORAL at 08:41

## 2024-04-09 RX ADMIN — ACETAMINOPHEN 325MG 650 MG: 325 TABLET ORAL at 03:57

## 2024-04-09 RX ADMIN — HYDROMORPHONE HYDROCHLORIDE 0.5 MG: 1 INJECTION, SOLUTION INTRAMUSCULAR; INTRAVENOUS; SUBCUTANEOUS at 01:04

## 2024-04-09 RX ADMIN — LISINOPRIL 10 MG: 10 TABLET ORAL at 08:41

## 2024-04-09 NOTE — NURSING NOTE
Patient is POD #1 lumbar surgery.  Patient is alert and oriented x 4.  Dressing dry and intact.  Patient ambulates to bathroom without difficulty.   Voiding spontaneously.  Robaxin, Roxicodone and Dilaudid ordered for pain management per MAR.  Wife at bedside and attentive to patient's needs.     No signs of distress.     Care ongoing.

## 2024-04-09 NOTE — DISCHARGE SUMMARY
Mark Ballard  1980    Patient Care Team:  Caren Zarco APRN as PCP - General (Family Medicine)  Kendra Montes, ALFRED as Ambulatory  ([facility])    Date of Admit: 4/8/2024    Date of Discharge:  4/9/2024    Discharge Diagnosis:  Lumbar disc disease with radiculopathy    Synovial cyst      Procedures Performed  Procedure(s):  LUMBAR FOUR TO FIVE TRANSFORAMINAL INTERBODY FUSION MINIMALLY INVASIVE NAVIGATION       Complications: None    Consultants:   Consults       No orders found for last 30 day(s).            Condition on Discharge: stable    Discharge disposition: home    Pertinent Test Results: radiology: X-Ray: These postoperative lumbar x-rays have been reviewed with Dr. Gonzalez.  Lumbar spine x-rays performed this morning, April 9, 2024 show adequate hardware placement with intact disc spacer at L4-5.  Pedicle screws and rods are in satisfactory position with no evidence of malalignment or hardware complication.     Brief HPI: This is a very pleasant 43-year-old male with persistent lumbar radiculopathy secondary to a right L4-5 synovial cyst causing severe stenosis.  He had failed all conservative measures including epidural steroid injections and physical therapy.  He was brought to the operating room for the above-stated elective surgical procedure.  Please see admission H&P for further details.      Hospital Course: The patient tolerated the surgical procedure without any complication.  He has been walking in the hallways with physical therapy.  He is voiding and tolerating a diet without difficulty.  He is passing some flatus.  He is on stool softeners as well.  He reports that his pain is being adequately managed with muscle relaxers and oral Roxicodone.  He will continue both of these medications at home as well as stool softeners.  Prescriptions have been given for all 3 medications.  Both he and his wife state that he was evaluated by physical therapy.  He is navigating stairs  without difficulty.  He is very pleased with his progress and reports complete resolution in the preoperative right lumbar radicular pain.  He has remained afebrile and his vital signs have been stable.  The lumbar dressings are dry and intact.  There is no redness, swelling or drainage.  He has no calf pain, swelling or tenderness to bilateral palpation.  He denies any chest pain, abdominal pain, nausea or shortness of breath.  He has his lumbar brace and is tolerating it well. He feels ready for discharge home today.      Temp:  [97.4 °F (36.3 °C)-98.4 °F (36.9 °C)] 97.9 °F (36.6 °C)  Heart Rate:  [72-91] 77  Resp:  [14-19] 17  BP: ()/(59-82) 94/67    Current labs:  Lab Results (last 24 hours)       Procedure Component Value Units Date/Time    Basic Metabolic Panel [[id number]]  (Abnormal) Collected: 04/09/24 0413    Specimen: Blood Updated: 04/09/24 0508     Glucose 137 mg/dL      BUN 13 mg/dL      Creatinine 0.86 mg/dL      Sodium 138 mmol/L      Potassium 3.6 mmol/L      Chloride 104 mmol/L      CO2 24.8 mmol/L      Calcium 8.3 mg/dL      BUN/Creatinine Ratio 15.1     Anion Gap 9.2 mmol/L      eGFR 110.2 mL/min/1.73     Narrative:      GFR Normal >60  Chronic Kidney Disease <60  Kidney Failure <15              Discharge Medications  VANESSA has been reviewed and narcotic consent is on file in the patient's chart.     Your medication list        START taking these medications        Instructions Last Dose Given Next Dose Due   Methocarbamol 1000 MG tablet      Take 1,000 mg by mouth 4 (Four) Times a Day As Needed for Muscle Spasms.       naloxone 4 MG/0.1ML nasal spray  Commonly known as: NARCAN      Call 911. Don't prime. [place] in 1 nostril for overdose. Repeat in 2-3 minutes in other nostril if no or minimal breathing/responsiveness.       oxyCODONE 5 MG immediate release tablet  Commonly known as: ROXICODONE      Take 1 tablet by mouth Every 4 (Four) Hours As Needed for Moderate Pain for up to 3 days.        sennosides-docusate 8.6-50 MG per tablet  Commonly known as: PERICOLACE      Take 1 tablet by mouth 2 (Two) Times a Day.              CONTINUE taking these medications        Instructions Last Dose Given Next Dose Due   DULoxetine 20 MG capsule  Commonly known as: Cymbalta  Start taking on: March 14, 2024      Take 1 capsule by mouth Every Night for 7 days, THEN 1 capsule 2 (Two) Times a Day for 60 days.       gabapentin 300 MG capsule  Commonly known as: NEURONTIN      Take 1 capsule by mouth 3 (Three) Times a Day.       hydroCHLOROthiazide 25 MG tablet      Take 1 tablet by mouth Daily.       lisinopril 10 MG tablet  Commonly known as: PRINIVIL,ZESTRIL      Take 1 tablet by mouth Daily.       omeprazole 40 MG capsule  Commonly known as: priLOSEC      Take 1 capsule by mouth Daily.       topiramate 25 MG tablet  Commonly known as: TOPAMAX      Take 1 tablet by mouth Daily.              STOP taking these medications      diclofenac 75 MG EC tablet  Commonly known as: VOLTAREN        HYDROcodone-acetaminophen 7.5-325 MG per tablet  Commonly known as: NORCO        Ibuprofen 3 %, Gabapentin 10 %, Baclofen 2 %, lidocaine 4 %, Ketamine HCl 12 %        phentermine 37.5 MG tablet  Commonly known as: ADIPEX-P        Wegovy 2.4 MG/0.75ML solution auto-injector  Generic drug: Semaglutide-Weight Management                  Where to Get Your Medications        These medications were sent to [facility] [place] [id number]      Hours: Monday to Friday 7 AM to 6 PM, Saturday & Sunday 8 AM to 4:30 PM (Closed 12 PM to 12:30 PM) Phone: [phone]   Methocarbamol 1000 MG tablet  naloxone 4 MG/0.1ML nasal spray  oxyCODONE 5 MG immediate release tablet  sennosides-docusate 8.6-50 MG per tablet         Discharge Diet:   Diet Instructions       Diet: Regular/House Diet; Thin (IDDSI 0)      Discharge Diet: Regular/House Diet    Fluid Consistency: Thin (IDDSI 0)          Diet Order  "  Procedures    Diet: Regular/House; Fluid Consistency: Thin (IDDSI 0)       Activity at Discharge:       Call for: questions or concerns    Follow-up Appointments  Future Appointments   Date Time Provider Department [place]   4/24/2024  9:15 AM Adolfo Gonzalez MD [facility]      Follow-up Information       Caren Zarco, APRN .    Specialty: Family Medicine  Contact information:  [address] [place] [address]  [phone]                           Additional Instructions for the Follow-ups that You Need to Schedule       Call MD With Problems / Concerns   As directed      Instructions: Should you have any chest pain, shortness of breath, please go to the nearest emergency room.  If you have worsening pain symptoms, incisional swelling, redness or drainage or any of the other symptoms listed on the postoperative instruction sheet attached in the after visit summary, please call the office.  If you have other questions or concerns not addressed in the after visit summary instructions, please call the office [phone].    Order Comments: Instructions: Should you have any chest pain, shortness of breath, please go to the nearest emergency room.  If you have worsening pain symptoms, incisional swelling, redness or drainage or any of the other symptoms listed on the postoperative instruction sheet attached in the after visit summary, please call the office.  If you have other questions or concerns not addressed in the after visit summary instructions, please call the office [phone].         Discharge Follow-up with Specialty: Neurosurgery; 2 Weeks   As directed      Specialty: Neurosurgery   Follow Up: 2 Weeks   Follow Up Details: Keep your postoperative appointment as previously arranged in 2 to 3 weeks with Dr. Gonzalez.                  I discussed the discharge instructions with patient and family    Ana Quesada, ELIN  04/09/24  13:09 EDT      \"Dictated utilizing Dragon dictation\".    "
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
## 2024-04-09 NOTE — DISCHARGE INSTRUCTIONS
[facility] Neurological Surgery  [address], [address]  [place] [address]  Phone:  [phone]  Fax:  [phone]    Dr. Adolfo Gonzalez MD          Lumbar Fusion Surgery Post-Operative Instructions    Change your bandage in about 24 hours following surgery and you may replace with another guaze bandage (especially if you have staples) or leave it off.  Check the cut (incision) made by the surgeon twice a day for signs of infection.  Some signs of infection may include:  A foul smelling, greenish or yellowish discharge form the wound.  Increased pain  Increased redness over the incision (operative) site  Skin edges separate  Flu-like symptoms (problems)  A temperature above 101.5° F (38.6° C) .    You may resume normal diet as you tolerate    You should wear your brace when you are out of bed (to standing, sit or walk) longer than getting up to use the restroom.     No lifting overhead, although you may place your arms above your head.  DO NOT lift anything over five (5) pounds.  Walking is allowed and encouraged. There are no restrictions on sitting or climbing stairs, but refrain from overly strenuous activity.  You may notice that a constant position (standing or sitting) greater than 45 minutes may tend to make you more sore and therefore it is recommended that you change positions frequently. Do not drive until you are no longer requiring narcotic pain medications for pain control.      Walking is permitted.  You may use a treadmill without an incline.  Back off on activity if you have discomfort.  You may also use stairs as much as you can tolerate, just take it slow and easy.    Refrain from any sexual activity until after your first evaluation at the office.     Back pain after surgery may be worsen for 2 to 3 days following surgery.  It should smooth out after the third day.  Continue the pain medication and muscle relaxers as prescribed.  You may also need to take a stool softener like Senokot-S if you  develop constipation.    You may shower and pat the incision dry, but do not soak your wound in water such as a swimming pool, hot tub or lake.   Avoid direct sunlight to the wound for at least three (3) months.  You may apply ice to the surgical site for 15 to 20 minutes each hour while awake for the first few days following surgery.  Simply put the ice in a plastic bag in place a towel between the bag of ice and your skin.    You may experience itching around the staples or sutures of your incision.  This is a normal part of the healing process.  Please do not scratch her incision.    You will leave the hospital with prescriptions for pain medicine and a muscle relaxer.  These medications must be taken as prescribed only.  If a refill of your pain medication is required, in order to have this medication refilled, you must contact the office 3 days (72 hrs) prior to running out, but the amount prescribed is generally enough to last until the first post-operative visit.      A small amount of bleeding from the incision during the first few days is not unusual.       You should have a post-operative visit approximately 2 weeks after surgery.  If you do not have a scheduled appointment, call the office at [phone] to schedule one.  We will discuss return to work at your first post-operative visit, but you can expect to be off of work for an average of 6 weeks.     Notify the office if there are any problems, such as:    Excessive back or leg pain  If you lose control of urine or bowel movements  Persistent temperature of 101.5° F (38.6° C) or greater that is not relieved by Tylenol.  Excessive bleeding, redness, heat, leakage of fluid, swelling or pus around the incision   If you develop difficulty breathing, have chest pain or shortness of breath, call 911.  If you develop swelling in the calf or leg  Your pain is not controlled with medication  You seem to be getting worse rather than better    Thank you.

## 2024-04-09 NOTE — PLAN OF CARE
Goal Outcome Evaluation:      Patient is POD # 1for lumbar surgery.  Patient is alert x4.  Dressing is dry, and intact.  Patient was able to ambulate from the stretcher to the bed.  Voiding function is intact. Dilaudid, and roxicodone were ordered for pain management.  Patient is discharging to home today.

## 2024-04-10 ENCOUNTER — TELEPHONE (OUTPATIENT)
Dept: NEUROSURGERY | Facility: CLINIC | Age: 44
End: 2024-04-10
Payer: COMMERCIAL

## 2024-04-10 NOTE — TELEPHONE ENCOUNTER
Patient will be out of oxycodone 5 mg by Friday and they are requesting that [facility] before Friday so that patient does not run out of medication.

## 2024-04-11 ENCOUNTER — TELEPHONE (OUTPATIENT)
Dept: NEUROSURGERY | Facility: CLINIC | Age: 44
End: 2024-04-11
Payer: COMMERCIAL

## 2024-04-11 DIAGNOSIS — M51.16 LUMBAR DISC DISEASE WITH RADICULOPATHY: ICD-10-CM

## 2024-04-11 DIAGNOSIS — M43.16 SPONDYLOLISTHESIS OF LUMBAR REGION: ICD-10-CM

## 2024-04-11 DIAGNOSIS — M71.30 SYNOVIAL CYST: ICD-10-CM

## 2024-04-11 RX ORDER — OXYCODONE HYDROCHLORIDE 5 MG/1
5 TABLET ORAL EVERY 4 HOURS PRN
Qty: 60 TABLET | Refills: 0 | Status: SHIPPED | OUTPATIENT
Start: 2024-04-11 | End: 2024-04-22

## 2024-04-11 NOTE — TELEPHONE ENCOUNTER
Received a call from Mark Ballard, day 3 status post L4-5 TLIF with Dr. Gonzalez.  He was discharged home on 4/9.  Currently not having any issues with regard to pain or any issues in the legs.  His wife reports that she noticed some slight redness around his lumbar sites earlier this morning but have progressed to become more erythematous and blistering.  There is surgical glue in place with no bandages present.  She states that the wounds have not opened up.  I asked her to send a picture of the wound to [facility].  She notes clear or serosanguineous drainage with some blistering around the surrounding areas where the glue is located.  The patient denies any fevers, chills, and pain at the incision sites but does report itching around the sites.  I recommended the patient take Benadryl and will have him come into the office tomorrow to look at the site.  I will call family in the morning to schedule a time for the patient to come in.  He will let us know if he develops any fever, chills or worsening drainage or if the wound opens up or he develops any other signs and symptoms of infection.

## 2024-04-12 ENCOUNTER — TELEPHONE (OUTPATIENT)
Dept: NEUROSURGERY | Facility: CLINIC | Age: 44
End: 2024-04-12

## 2024-04-12 ENCOUNTER — OFFICE VISIT (OUTPATIENT)
Dept: NEUROSURGERY | Facility: CLINIC | Age: 44
End: 2024-04-12
Payer: COMMERCIAL

## 2024-04-12 VITALS
DIASTOLIC BLOOD PRESSURE: 80 MMHG | HEIGHT: 73 IN | BODY MASS INDEX: 36.18 KG/M2 | WEIGHT: 273 LBS | RESPIRATION RATE: 20 BRPM | SYSTOLIC BLOOD PRESSURE: 136 MMHG

## 2024-04-12 DIAGNOSIS — L24.89 IRRITANT CONTACT DERMATITIS DUE TO OTHER AGENTS: Primary | ICD-10-CM

## 2024-04-12 DIAGNOSIS — Z51.89 VISIT FOR WOUND CHECK: ICD-10-CM

## 2024-04-12 PROCEDURE — 99024 POSTOP FOLLOW-UP VISIT: CPT | Performed by: NURSE PRACTITIONER

## 2024-04-12 NOTE — TELEPHONE ENCOUNTER
I called and LVM for patient to see if he can be here at 1:15 for a 1:30 appt.    [facility] and office can confirm

## 2024-04-12 NOTE — PROGRESS NOTES
"Subjective   Patient ID: Mark Ballard is a 43 y.o. male is here today for follow-up on a wound check.  Patient had L4-5 Transforaminal interbody fusion on 4/8/24    History of Present Illness    Patient presents today to have surgical incision checked.  Patient was discharged home from the hospital on 4/9/2024 after having the above-mentioned surgical procedure.  Wife states that when they got home on Tuesday they removed the dressing.  Wednesday they noted some redness developing around the 2 lumbar surgical incisions.  On Thursday they noted some clear drainage from both of the incisions.  Wife reports that patient had a knee replacement in 2021 and had a very similar occurrence with that surgical incision as he is having it now.  Patient reports that the rash does itch.  It is erythematous with some small blisters noted.  Patient denies fever, chills, nausea, vomiting.  He reports no significant postop pain.  Ports otherwise doing well.  He did take 50 mg of Benadryl last night but has had no other medications for this rash.    Patient presents today with his wife      The following portions of the patient's history were reviewed and updated as appropriate: allergies, current medications, past family history, past medical history, past social history, past surgical history, and problem list.    Review of Systems   Constitutional:  Negative for chills and fever.   Gastrointestinal:  Negative for nausea and vomiting.   Musculoskeletal:  Negative for back pain and gait problem.   Skin:  Positive for rash (bilateral lumbar surgical incisions).   Neurological:  Negative for weakness and numbness.   Psychiatric/Behavioral:  The patient is not nervous/anxious.    All other systems reviewed and are negative.        Objective     Vitals:    04/12/24 1417   BP: 136/80   BP Location: Left arm   Patient Position: Sitting   Cuff Size: Adult   Resp: 20   Weight: 124 kg (273 lb)   Height: 184.2 cm (72.52\")   PainSc: 0-No " pain     Body mass index is 36.5 kg/m².    Tobacco Use: Medium Risk (4/12/2024)    Patient History     Smoking Tobacco Use: Former     Smokeless Tobacco Use: Never     Passive Exposure: Past          Physical Exam  Vitals reviewed.   Constitutional:       Appearance: Normal appearance.   Skin:     General: Skin is warm and dry.          Neurological:      Mental Status: He is alert and oriented to person, place, and time.      Gait: Gait is intact.   Psychiatric:         Speech: Speech normal.       Neurologic Exam     Mental Status   Oriented to person, place, and time.   Speech: speech is normal   Level of consciousness: alert  Knowledge: good.     Gait, Coordination, and Reflexes     Gait  Gait: normal          Assessment & Plan   Independent Review of Radiographic Studies:      No new imaging to review      Medical Decision Making:      Presents today status post above mentioned procedure on April 8, 2024.  He is here today to have his surgical incisions checked after he developed a pruritic, erythematous, blistery rash around each lumbar incision.  The rash appears to be a reaction to the adhesive from previous dressing and possibly the Dermabond.  Patient has had a previous reaction to Dermabond after a knee replacement surgery in 2021.  The incisions are well-approximated and do not appear to be infected.  Dr. Gonzalez also her symptoms at the patient's rash.  He requested that the Dermabond be removed from the incisions and incisions stapled.  No antibiotics prescribed at this time, as mentioned above the rash/incisions do not appear infected.    The bilateral lumbar surgical incisions were cleaned with alcohol swabs, the Dermabond was easily removed.  In the lower left lumbar surgical incision I placed 3 staples, in the right lumbar surgical incision and placed 4 staples.  Patient tolerated this well.  He was instructed to keep the area clean and dry, no dressing needed at this point.  He was instructed to  continue taking Benadryl, Pepcid 40 mg a day for the next 7 days and a over-the-counter allergy medication such as Claritin.  He was scheduled to have a 2-week postop appointment on April 24, we will move that to April 22 in order to remove the staples and reevaluate the surgical incisions.  He is to notify the office immediately if he were to develop a fever, chills, nausea, vomiting, increased pain, purulent drainage from incisions.    Diagnoses and all orders for this visit:    1. Irritant contact dermatitis due to other agents (Primary)    2. Visit for wound check      Return in about 10 days (around 4/22/2024) for Recheck.

## 2024-04-17 NOTE — PROGRESS NOTES
Subjective   Patient ID: Mark Ballard is a 43 y.o. male is here today for follow-up for a wound recheck.  Patient had L4-5 transforaminal interbody fusion on 4/8/2024.  He was seen 4/12/24 for allergic reaction to Dermabond.    History of Present Illness    Patient presents today for wound check.  He was seen a week ago and found to be having a reaction to the Dermabond lumbar surgical incision site.  He states he has been doing better, he is no longer requiring Benadryl and Pepcid.  The rash is improved.  He is reporting some mild achiness in his left anterior thigh that has started about a week ago and is worse at night.  Reports no significant back pain.  He is still taking gabapentin, Cymbalta and muscle relaxers.  He feels he is improving from the surgery.  He denies fever, chills, leg weakness, back pain.    Presents today with his wife    The following portions of the patient's history were reviewed and updated as appropriate: allergies, current medications, past family history, past medical history, past social history, past surgical history, and problem list.    Review of Systems   Constitutional:  Negative for chills and fever.   Musculoskeletal:  Negative for back pain and neck pain.   Skin:  Negative for wound (redness, swelling, drainage).        itching   Neurological:  Negative for headaches.   Psychiatric/Behavioral:  Negative for confusion.        Objective     Vitals:    04/22/24 0912   BP: 120/90   Pulse: 100   SpO2: 96%   Weight: 124 kg (274 lb)     Body mass index is 36.63 kg/m².    Tobacco Use: Medium Risk (4/22/2024)    Patient History     Smoking Tobacco Use: Former     Smokeless Tobacco Use: Never     Passive Exposure: Past          Physical Exam  Neurologic Exam        Assessment & Plan   Independent Review of Radiographic Studies:      No imaging to review        Medical Decision Making:      Patient presents today for wound check to his lumbar surgical incisions.  Rashes resolved, his  lumbar surgical incisions are well-appearing.  The staples from the bilateral lumbar incisions were removed today.  There is no redness, swelling, drainage noted from the incisions.  They are both well-approximated.  Patient was advised that he can start driving, as long as he is not taking pain medication.  He is requesting a refill for his Robaxin, that will be called in today.  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  States that this will not require any lifting, bending or twisting.  Overall doing very well.  We will see him back in a month with x-rays.  He is to call the office with any questions or concerns.      Diagnoses and all orders for this visit:    1. Lumbar disc disease with radiculopathy (Primary)  -     XR Spine Lumbar 2 or 3 View; Future    2. Irritant contact dermatitis due to other agents    3. Visit for wound check    Other orders  -     methocarbamol (ROBAXIN) 750 MG tablet; Take 1 tablet by mouth 4 (Four) Times a Day As Needed for Muscle Spasms.  Dispense: 40 tablet; Refill: 1      Return in about 4 weeks (around 5/20/2024).

## 2024-04-22 ENCOUNTER — OFFICE VISIT (OUTPATIENT)
Dept: NEUROSURGERY | Facility: CLINIC | Age: 44
End: 2024-04-22
Payer: COMMERCIAL

## 2024-04-22 VITALS
BODY MASS INDEX: 36.63 KG/M2 | SYSTOLIC BLOOD PRESSURE: 120 MMHG | HEART RATE: 100 BPM | WEIGHT: 274 LBS | DIASTOLIC BLOOD PRESSURE: 90 MMHG | OXYGEN SATURATION: 96 %

## 2024-04-22 DIAGNOSIS — L24.89 IRRITANT CONTACT DERMATITIS DUE TO OTHER AGENTS: ICD-10-CM

## 2024-04-22 DIAGNOSIS — Z51.89 VISIT FOR WOUND CHECK: ICD-10-CM

## 2024-04-22 DIAGNOSIS — M51.16 LUMBAR DISC DISEASE WITH RADICULOPATHY: Primary | ICD-10-CM

## 2024-04-22 PROCEDURE — 99024 POSTOP FOLLOW-UP VISIT: CPT | Performed by: NURSE PRACTITIONER

## 2024-04-22 RX ORDER — METHOCARBAMOL 750 MG/1
750 TABLET, FILM COATED ORAL 4 TIMES DAILY PRN
Qty: 40 TABLET | Refills: 1 | Status: SHIPPED | OUTPATIENT
Start: 2024-04-22

## 2024-04-24 DIAGNOSIS — M51.16 LUMBAR DISC DISEASE WITH RADICULOPATHY: ICD-10-CM

## 2024-04-25 RX ORDER — GABAPENTIN 300 MG/1
300 CAPSULE ORAL 3 TIMES DAILY
Qty: 90 CAPSULE | Refills: 2 | Status: SHIPPED | OUTPATIENT
Start: 2024-04-25

## 2024-05-03 RX ORDER — HYDROCHLOROTHIAZIDE 25 MG/1
25 TABLET ORAL DAILY
Qty: 90 TABLET | Refills: 3 | Status: CANCELLED | OUTPATIENT
Start: 2024-04-25

## 2024-05-03 RX ORDER — TOPIRAMATE 25 MG/1
25 TABLET ORAL DAILY
Qty: 30 TABLET | Refills: 8 | Status: CANCELLED | OUTPATIENT
Start: 2024-04-25

## 2024-05-06 DIAGNOSIS — M43.16 SPONDYLOLISTHESIS OF LUMBAR REGION: ICD-10-CM

## 2024-05-07 RX ORDER — DULOXETIN HYDROCHLORIDE 20 MG/1
CAPSULE, DELAYED RELEASE ORAL
Qty: 127 CAPSULE | Refills: 0 | Status: SHIPPED | OUTPATIENT
Start: 2024-05-07 | End: 2024-07-14
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
## 2024-05-22 ENCOUNTER — HOSPITAL ENCOUNTER (OUTPATIENT)
Dept: GENERAL RADIOLOGY | Facility: HOSPITAL | Age: 44
Discharge: HOME | End: 2024-05-22
Admitting: NURSE PRACTITIONER
Payer: COMMERCIAL

## 2024-05-22 ENCOUNTER — OFFICE VISIT (OUTPATIENT)
Dept: NEUROSURGERY | Facility: CLINIC | Age: 44
End: 2024-05-22
Payer: COMMERCIAL

## 2024-05-22 VITALS
HEART RATE: 94 BPM | DIASTOLIC BLOOD PRESSURE: 68 MMHG | TEMPERATURE: 97.9 F | SYSTOLIC BLOOD PRESSURE: 124 MMHG | RESPIRATION RATE: 16 BRPM | BODY MASS INDEX: 37.98 KG/M2 | OXYGEN SATURATION: 97 % | HEIGHT: 73 IN | WEIGHT: 286.6 LBS

## 2024-05-22 DIAGNOSIS — M51.16 LUMBAR DISC DISEASE WITH RADICULOPATHY: Primary | ICD-10-CM

## 2024-05-22 DIAGNOSIS — M71.30 SYNOVIAL CYST: ICD-10-CM

## 2024-05-22 DIAGNOSIS — M43.16 SPONDYLOLISTHESIS OF LUMBAR REGION: ICD-10-CM

## 2024-05-22 DIAGNOSIS — M51.16 LUMBAR DISC DISEASE WITH RADICULOPATHY: ICD-10-CM

## 2024-05-22 DIAGNOSIS — M46.1 SI (SACROILIAC) JOINT INFLAMMATION: ICD-10-CM

## 2024-05-22 PROCEDURE — 99024 POSTOP FOLLOW-UP VISIT: CPT | Performed by: NEUROLOGICAL SURGERY

## 2024-05-22 PROCEDURE — 72100 X-RAY EXAM L-S SPINE 2/3 VWS: CPT

## 2024-06-12 RX ORDER — DICLOFENAC SODIUM 75 MG/1
75 TABLET, DELAYED RELEASE ORAL 2 TIMES DAILY
Qty: 60 TABLET | Refills: 3 | Status: SHIPPED | OUTPATIENT
Start: 2024-06-12

## 2024-06-24 ENCOUNTER — HOSPITAL ENCOUNTER (OUTPATIENT)
Dept: GENERAL RADIOLOGY | Facility: HOSPITAL | Age: 44
Discharge: HOME | End: 2024-06-24
Payer: COMMERCIAL

## 2024-06-24 ENCOUNTER — TELEPHONE (OUTPATIENT)
Dept: NEUROSURGERY | Facility: CLINIC | Age: 44
End: 2024-06-24
Payer: COMMERCIAL

## 2024-06-24 ENCOUNTER — ANESTHESIA (OUTPATIENT)
Dept: PAIN MEDICINE | Facility: HOSPITAL | Age: 44
End: 2024-06-24
Payer: COMMERCIAL

## 2024-06-24 ENCOUNTER — ANESTHESIA EVENT (OUTPATIENT)
Dept: PAIN MEDICINE | Facility: HOSPITAL | Age: 44
End: 2024-06-24
Payer: COMMERCIAL

## 2024-06-24 ENCOUNTER — TELEPHONE (OUTPATIENT)
Dept: ORTHOPEDIC SURGERY | Facility: CLINIC | Age: 44
End: 2024-06-24
Payer: COMMERCIAL

## 2024-06-24 ENCOUNTER — HOSPITAL ENCOUNTER (OUTPATIENT)
Dept: PAIN MEDICINE | Facility: HOSPITAL | Age: 44
Discharge: HOME | End: 2024-06-24
Payer: COMMERCIAL

## 2024-06-24 VITALS
OXYGEN SATURATION: 100 % | HEART RATE: 76 BPM | WEIGHT: 271 LBS | HEIGHT: 73 IN | BODY MASS INDEX: 35.92 KG/M2 | DIASTOLIC BLOOD PRESSURE: 66 MMHG | RESPIRATION RATE: 16 BRPM | SYSTOLIC BLOOD PRESSURE: 103 MMHG | TEMPERATURE: 97.7 F

## 2024-06-24 DIAGNOSIS — M46.1 SI (SACROILIAC) JOINT INFLAMMATION: Primary | ICD-10-CM

## 2024-06-24 DIAGNOSIS — R52 PAIN: ICD-10-CM

## 2024-06-24 PROCEDURE — 25010000002 METHYLPREDNISOLONE PER 40 MG: Performed by: ANESTHESIOLOGY

## 2024-06-24 PROCEDURE — 25010000002 BUPIVACAINE (PF) 0.25 % SOLUTION: Performed by: ANESTHESIOLOGY

## 2024-06-24 RX ORDER — MIDAZOLAM HYDROCHLORIDE 1 MG/ML
1 INJECTION INTRAMUSCULAR; INTRAVENOUS ONCE AS NEEDED
Status: DISCONTINUED | OUTPATIENT
Start: 2024-06-24 | End: 2024-06-25 | Stop reason: HOSPADM

## 2024-06-24 RX ORDER — LIDOCAINE HYDROCHLORIDE 10 MG/ML
1 INJECTION, SOLUTION INFILTRATION; PERINEURAL ONCE
Status: DISCONTINUED | OUTPATIENT
Start: 2024-06-24 | End: 2024-06-25 | Stop reason: HOSPADM

## 2024-06-24 RX ORDER — FENTANYL CITRATE 50 UG/ML
50 INJECTION, SOLUTION INTRAMUSCULAR; INTRAVENOUS ONCE
Status: DISCONTINUED | OUTPATIENT
Start: 2024-06-24 | End: 2024-06-25 | Stop reason: HOSPADM

## 2024-06-24 RX ORDER — BUPIVACAINE HYDROCHLORIDE 2.5 MG/ML
10 INJECTION, SOLUTION EPIDURAL; INFILTRATION; INTRACAUDAL ONCE
Status: COMPLETED | OUTPATIENT
Start: 2024-06-24 | End: 2024-06-24

## 2024-06-24 RX ORDER — METHYLPREDNISOLONE ACETATE 40 MG/ML
40 INJECTION, SUSPENSION INTRA-ARTICULAR; INTRALESIONAL; INTRAMUSCULAR; SOFT TISSUE ONCE
Status: COMPLETED | OUTPATIENT
Start: 2024-06-24 | End: 2024-06-24

## 2024-06-24 RX ADMIN — BUPIVACAINE HYDROCHLORIDE 10 ML: 2.5 INJECTION, SOLUTION EPIDURAL; INFILTRATION; INTRACAUDAL at 08:27

## 2024-06-24 RX ADMIN — METHYLPREDNISOLONE ACETATE 40 MG: 40 INJECTION, SUSPENSION INTRA-ARTICULAR; INTRALESIONAL; INTRAMUSCULAR; INTRASYNOVIAL; SOFT TISSUE at 08:27

## 2024-06-24 NOTE — ANESTHESIA PROCEDURE NOTES
PAIN SI joint injection    Pre-sedation assessment completed: 6/24/2024 8:25 AM    Patient reassessed immediately prior to procedure    Patient location during procedure: Pain Clinic  Start time: 6/24/2024 8:26 AM  Stop time: 6/24/2024 8:31 AM    Reason for block: procedure for pain  Performed by  Anesthesiologist: Pato Sterling MD  Preanesthetic Checklist  Completed: patient identified, IV checked, site marked, risks and benefits discussed, surgical consent, monitors and equipment checked, pre-op evaluation and timeout performed  Prep:  Patient position: supine  Sterile barriers:mask, gloves and cap  Prep: ChloraPrep  Patient monitoring: blood pressure monitoring, continuous pulse oximetry and EKG  Procedure:  Sedation:yes  Guidance:fluoroscopy  Location:Right SIJ  Medications:  Depomedrol:40 mg  Comment:Imaging was utilized to place needle in the right sacroiliac joint.  Careful aspiration was negative.  1 mL of 0.25% bupivacaine and 40 mg of Depo-Medrol were slowly injected.  The needle was withdrawn.  He tolerated the procedure well.    Post Assessment  Patient Tolerance:comfortable throughout block  Complications:no
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
## 2024-06-24 NOTE — TELEPHONE ENCOUNTER
9:30 AM  Patient called back. The APC schedule is not out yet for July. When schedule is out a call will be placed to patient to schedule. Sent message to  in regards to scheduling with an APC.      Called patient and left VM. Asked patient to call back in regards to scheduling his appt.                                                ----- Message from Tracey BHATIA sent at 6/24/2024  8:58 AM EDT -----  Regarding: FW: Appointment Request  Contact: [phone]    ----- Message -----  From: Mark Ballard  Sent: 6/24/2024   8:46 AM EDT  To: [facility] Neuro[facility]  Subject: Appointment Request                              Appointment Request From: Mark Ballard    With Provider: Adolfo Gonzalez [[facility] NEUROSURGERY]    Preferred Date Range: Any    Preferred Times: Any Time    Reason for visit: Follow-up    Comments:  I still have not gotten any information regarding my follow up dhiraj with dr gonzalez

## 2024-06-24 NOTE — TELEPHONE ENCOUNTER
----- Message from [facility] [employer] sent at 6/24/2024  8:44 AM EDT -----  Regarding: Appointment Request  Contact: [phone]  Appointment Request From: Mark Ballard    With Provider: Luisa Villaseñor [[facility] ORTHOPEDICS]    Preferred Date Range: 6/25/2024 – 8/3/2024    Preferred Times: Any Time    Reason for visit: Follow-up    Comments:  F/U on Greater trochanteric bursitis, I just had a SI joint steroid injection per Dr. Gonzalez. However the Anesthesiologist Dr. Sterling believes the more underlying problem is the Greater trochanter bursitis problem that you initially thought was the problem. So Hallie recommended for this to be followed up on along with PT, he feels this might be more of the problem than the SI joint.  based on his physical assessment     this earliest AM appt is the best.

## 2024-06-24 NOTE — DISCHARGE INSTRUCTIONS
After your SI joint injection:  -Please schedule your second SI joint injection at the time of your first SI joint injection for 2 weeks later  -Alternate 1 tablet of extra strength Tylenol and 2 tablets of Aleve (or any other NSAID) in a scheduled manner every 4 hours for at least 2 weeks after the injection  -Continue to use ice packs as much as tolerable and if you can tolerate an ice bath at least up to the waist  -The patient can also consider taking an ice bath by submerging the buttocks area in 60 degree water for 30 minutes/day for 5 days consecutively.   -Try adding ice gradually to decrease the shock of the ice bath   -Try putting a robe in the dryer for 35 minutes so that the patient can have a warm robe to wear after the ice bath  -After 2 injections 2 weeks apart, if your symptoms have not fully resolved we can consider an SI joint fusion

## 2024-07-01 ENCOUNTER — OFFICE VISIT (OUTPATIENT)
Dept: ORTHOPEDIC SURGERY | Facility: CLINIC | Age: 44
End: 2024-07-01
Payer: COMMERCIAL

## 2024-07-01 ENCOUNTER — TELEPHONE (OUTPATIENT)
Dept: NEUROSURGERY | Facility: CLINIC | Age: 44
End: 2024-07-01
Payer: COMMERCIAL

## 2024-07-01 VITALS — HEIGHT: 73 IN | WEIGHT: 271 LBS | BODY MASS INDEX: 35.92 KG/M2

## 2024-07-01 DIAGNOSIS — M70.61 GREATER TROCHANTERIC BURSITIS, RIGHT: Primary | ICD-10-CM

## 2024-07-01 PROCEDURE — 99213 OFFICE O/P EST LOW 20 MIN: CPT | Performed by: NURSE PRACTITIONER

## 2024-07-01 NOTE — TELEPHONE ENCOUNTER
----- Message from Tavon HANSON sent at 6/24/2024  9:31 AM EDT -----  Kelsey Flores,    When the APC nichole comes out can you please put this patient with Nurse Myesha if she has clinic in July. He has waited a while. He is coming in for a follow up after si injection (6-24-24).      Thank you!
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
## 2024-07-09 ENCOUNTER — HOSPITAL ENCOUNTER (OUTPATIENT)
Dept: GENERAL RADIOLOGY | Facility: HOSPITAL | Age: 44
Discharge: HOME | End: 2024-07-09
Admitting: NEUROLOGICAL SURGERY
Payer: COMMERCIAL

## 2024-07-09 ENCOUNTER — OFFICE VISIT (OUTPATIENT)
Dept: NEUROSURGERY | Facility: CLINIC | Age: 44
End: 2024-07-09
Payer: COMMERCIAL

## 2024-07-09 VITALS
BODY MASS INDEX: 35.92 KG/M2 | DIASTOLIC BLOOD PRESSURE: 85 MMHG | WEIGHT: 271 LBS | SYSTOLIC BLOOD PRESSURE: 110 MMHG | OXYGEN SATURATION: 99 % | HEIGHT: 73 IN | HEART RATE: 54 BPM

## 2024-07-09 DIAGNOSIS — M46.1 SI (SACROILIAC) JOINT INFLAMMATION: Primary | ICD-10-CM

## 2024-07-09 DIAGNOSIS — M51.16 LUMBAR DISC DISEASE WITH RADICULOPATHY: ICD-10-CM

## 2024-07-09 PROCEDURE — 72100 X-RAY EXAM L-S SPINE 2/3 VWS: CPT

## 2024-09-11 ENCOUNTER — TELEPHONE (OUTPATIENT)
Dept: NEUROSURGERY | Facility: CLINIC | Age: 44
End: 2024-09-11
Payer: COMMERCIAL

## 2024-09-11 NOTE — TELEPHONE ENCOUNTER
----- Message from Tracey BHATIA sent at 9/10/2024  1:02 PM EDT -----  Regarding: FW: Appointment Request  Contact: [phone]    ----- Message -----  From: Mark Ballard  Sent: 9/10/2024  12:58 PM EDT  To: [facility]  Subject: Appointment Request                              Appointment Request From: Mark Ballard    With Provider: Adolfo Gonzalez [[facility] NEUROSURGERY]    Preferred Date Range: Any    Preferred Times: Monday Morning, Tuesday Morning, Wednesday Morning, Thursday Morning, Friday Morning    Reason for visit: Follow-up    Comments:  It’s supposed to be a 2 month follow up from my July 9th appointment, no one has called me with an appointment so I thought I should reach out to you all and see if I can get scheduled

## 2024-09-27 ENCOUNTER — TELEPHONE (OUTPATIENT)
Dept: NEUROSURGERY | Facility: CLINIC | Age: 44
End: 2024-09-27

## 2024-09-27 ENCOUNTER — OFFICE VISIT (OUTPATIENT)
Dept: NEUROSURGERY | Facility: CLINIC | Age: 44
End: 2024-09-27
Payer: COMMERCIAL

## 2024-09-27 VITALS
WEIGHT: 271 LBS | DIASTOLIC BLOOD PRESSURE: 80 MMHG | HEIGHT: 73 IN | RESPIRATION RATE: 20 BRPM | SYSTOLIC BLOOD PRESSURE: 118 MMHG | BODY MASS INDEX: 35.92 KG/M2

## 2024-09-27 DIAGNOSIS — M46.1 SI (SACROILIAC) JOINT INFLAMMATION: ICD-10-CM

## 2024-09-27 DIAGNOSIS — M51.16 LUMBAR DISC DISEASE WITH RADICULOPATHY: Primary | ICD-10-CM

## 2024-09-27 RX ORDER — METHOCARBAMOL 750 MG/1
750 TABLET, FILM COATED ORAL 4 TIMES DAILY PRN
Qty: 40 TABLET | Refills: 1 | Status: SHIPPED | OUTPATIENT
Start: 2024-09-27

## 2024-09-27 NOTE — TELEPHONE ENCOUNTER
Caller: LEANA CHAIDEZ    Relationship: Emergency Contact    Best call back number: [phone]    What is the best time to reach you: ASAP    Who are you requesting to speak with (clinical staff, provider,  specific staff member): RIYA    Do you know the name of the person who called: NA    What was the call regarding: PATIENTS WIFE CALLED AND SHE IS ASKING TO SPEAK WITH RIYA-SENDING TO OFFICE TO ADVISE THANK YOU    Is it okay if the provider responds through MyChart: NO

## 2024-11-20 RX ORDER — DICLOFENAC SODIUM 75 MG/1
75 TABLET, DELAYED RELEASE ORAL 2 TIMES DAILY
Qty: 60 TABLET | Refills: 3 | Status: SHIPPED | OUTPATIENT
Start: 2024-11-20

## 2024-11-20 NOTE — TELEPHONE ENCOUNTER
Rx Refill Note  Requested Prescriptions     Pending Prescriptions Disp Refills    diclofenac (VOLTAREN) 75 MG EC tablet 60 tablet 3     Sig: Take 1 tablet by mouth 2 (Two) Times a Day.      Last office visit with prescribing clinician: 7/1/2024      Next office visit with prescribing clinician: Visit date not found   Last Filled: 6/12/2024    Greater trochanteric bursitis, right    Isabelle Shabazz MA  11/20/24, 14:48 EST    Previous RX pended for your approval, change or denial.     {TIP  Encounters:    {TIP  Please add Last Relevant Lab Date if appropriate:  {TIP  Is Refill Pharmacy correct?:

## 2025-03-10 ENCOUNTER — OFFICE VISIT (OUTPATIENT)
Dept: ORTHOPEDIC SURGERY | Facility: CLINIC | Age: 45
End: 2025-03-10
Payer: COMMERCIAL

## 2025-03-10 VITALS — BODY MASS INDEX: 35.92 KG/M2 | HEIGHT: 73 IN | WEIGHT: 271 LBS

## 2025-03-10 DIAGNOSIS — Z96.651 S/P RIGHT UNICOMPARTMENTAL KNEE REPLACEMENT: ICD-10-CM

## 2025-03-10 DIAGNOSIS — M25.361 INSTABILITY OF RIGHT KNEE JOINT: Primary | ICD-10-CM

## 2025-03-10 PROCEDURE — 99214 OFFICE O/P EST MOD 30 MIN: CPT | Performed by: NURSE PRACTITIONER

## 2025-03-10 RX ORDER — PREDNISONE 10 MG/1
TABLET ORAL
Qty: 39 TABLET | Refills: 0 | Status: SHIPPED | OUTPATIENT
Start: 2025-03-10 | End: 2025-03-23
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
## 2025-03-13 ENCOUNTER — HOSPITAL ENCOUNTER (OUTPATIENT)
Dept: CT IMAGING | Facility: HOSPITAL | Age: 45
Discharge: HOME | End: 2025-03-13
Admitting: NURSE PRACTITIONER
Payer: COMMERCIAL

## 2025-03-13 DIAGNOSIS — M25.361 INSTABILITY OF RIGHT KNEE JOINT: ICD-10-CM

## 2025-03-13 DIAGNOSIS — Z96.651 S/P RIGHT UNICOMPARTMENTAL KNEE REPLACEMENT: ICD-10-CM

## 2025-03-13 PROCEDURE — 73700 CT LOWER EXTREMITY W/O DYE: CPT

## 2025-03-14 DIAGNOSIS — Z96.651 S/P RIGHT UNICOMPARTMENTAL KNEE REPLACEMENT: ICD-10-CM

## 2025-03-14 DIAGNOSIS — M25.361 INSTABILITY OF RIGHT KNEE JOINT: Primary | ICD-10-CM

## 2025-03-20 ENCOUNTER — TELEPHONE (OUTPATIENT)
Dept: ORTHOPEDIC SURGERY | Facility: CLINIC | Age: 45
End: 2025-03-20
Payer: COMMERCIAL

## 2025-03-20 NOTE — TELEPHONE ENCOUNTER
CALLED PATIENT LEFT VOICEMAIL TO SCHEDULE APPOINTMENT   With Provider: FERMIN CARTER [ARIELLA HOLLOWAY]  Preferred Date Range: 3/17/2025 - 3/17/2025  Preferred Times: Monday Morning, Tuesday Morning, Wednesday Morning, Thursday Morning, Friday Morning  Reason for Visit: Follow-up  Comments: Had my CT scan done on 3-13-25 was told to schedule an appointment after I had that done

## 2025-03-31 ENCOUNTER — HOSPITAL ENCOUNTER (OUTPATIENT)
Dept: NUCLEAR MEDICINE | Facility: HOSPITAL | Age: 45
Discharge: HOME | End: 2025-03-31
Payer: COMMERCIAL

## 2025-03-31 DIAGNOSIS — M25.361 INSTABILITY OF RIGHT KNEE JOINT: ICD-10-CM

## 2025-03-31 DIAGNOSIS — Z96.651 S/P RIGHT UNICOMPARTMENTAL KNEE REPLACEMENT: ICD-10-CM

## 2025-03-31 PROCEDURE — 78315 BONE IMAGING 3 PHASE: CPT

## 2025-03-31 PROCEDURE — 34310000005 TECHNETIUM MEDRONATE KIT: Performed by: NURSE PRACTITIONER

## 2025-03-31 PROCEDURE — A9503 TC99M MEDRONATE: HCPCS | Performed by: NURSE PRACTITIONER

## 2025-03-31 RX ORDER — TC 99M MEDRONATE 20 MG/10ML
22.4 INJECTION, POWDER, LYOPHILIZED, FOR SOLUTION INTRAVENOUS
Status: COMPLETED | OUTPATIENT
Start: 2025-03-31 | End: 2025-03-31

## 2025-03-31 RX ADMIN — TC 99M MEDRONATE 22.4 MILLICURIE: 20 INJECTION, POWDER, LYOPHILIZED, FOR SOLUTION INTRAVENOUS at 12:52

## 2025-04-08 ENCOUNTER — OFFICE VISIT (OUTPATIENT)
Dept: ORTHOPEDIC SURGERY | Facility: CLINIC | Age: 45
End: 2025-04-08
Payer: COMMERCIAL

## 2025-04-08 ENCOUNTER — LAB (OUTPATIENT)
Dept: LAB | Facility: HOSPITAL | Age: 45
End: 2025-04-08
Payer: COMMERCIAL

## 2025-04-08 VITALS — HEIGHT: 73 IN | WEIGHT: 271 LBS | BODY MASS INDEX: 35.92 KG/M2

## 2025-04-08 DIAGNOSIS — M25.461 EFFUSION OF RIGHT KNEE: Primary | ICD-10-CM

## 2025-04-08 DIAGNOSIS — Z96.651 S/P RIGHT UNICOMPARTMENTAL KNEE REPLACEMENT: ICD-10-CM

## 2025-04-08 LAB
APPEARANCE FLD: ABNORMAL
BASOPHILS # BLD AUTO: 0.03 10*3/MM3 (ref 0–0.2)
BASOPHILS NFR BLD AUTO: 0.5 % (ref 0–1.5)
COLOR FLD: YELLOW
CRP SERPL-MCNC: 0.94 MG/DL (ref 0–0.5)
CRYSTALS FLD MICRO: NORMAL
DEPRECATED RDW RBC AUTO: 39.5 FL (ref 37–54)
EOSINOPHIL # BLD AUTO: 0.1 10*3/MM3 (ref 0–0.4)
EOSINOPHIL NFR BLD AUTO: 1.8 % (ref 0.3–6.2)
ERYTHROCYTE [DISTWIDTH] IN BLOOD BY AUTOMATED COUNT: 12 % (ref 12.3–15.4)
ERYTHROCYTE [SEDIMENTATION RATE] IN BLOOD: 14 MM/HR (ref 0–15)
HCT VFR BLD AUTO: 41.3 % (ref 37.5–51)
HGB BLD-MCNC: 14 G/DL (ref 13–17.7)
IMM GRANULOCYTES # BLD AUTO: 0.01 10*3/MM3 (ref 0–0.05)
IMM GRANULOCYTES NFR BLD AUTO: 0.2 % (ref 0–0.5)
LYMPHOCYTES # BLD AUTO: 1.59 10*3/MM3 (ref 0.7–3.1)
LYMPHOCYTES NFR BLD AUTO: 28.9 % (ref 19.6–45.3)
MCH RBC QN AUTO: 30.2 PG (ref 26.6–33)
MCHC RBC AUTO-ENTMCNC: 33.9 G/DL (ref 31.5–35.7)
MCV RBC AUTO: 89 FL (ref 79–97)
MONOCYTES # BLD AUTO: 0.41 10*3/MM3 (ref 0.1–0.9)
MONOCYTES NFR BLD AUTO: 7.5 % (ref 5–12)
MONOS+MACROS NFR FLD: 80 %
NEUTROPHILS NFR BLD AUTO: 3.36 10*3/MM3 (ref 1.7–7)
NEUTROPHILS NFR BLD AUTO: 61.1 % (ref 42.7–76)
NEUTROPHILS NFR FLD MANUAL: 20 %
PLATELET # BLD AUTO: 340 10*3/MM3 (ref 140–450)
PMV BLD AUTO: 9.6 FL (ref 6–12)
RBC # BLD AUTO: 4.64 10*6/MM3 (ref 4.14–5.8)
RBC # FLD AUTO: 5000 /MM3
WBC # FLD AUTO: 280 /MM3
WBC NRBC COR # BLD AUTO: 5.5 10*3/MM3 (ref 3.4–10.8)

## 2025-04-08 PROCEDURE — 86140 C-REACTIVE PROTEIN: CPT

## 2025-04-08 PROCEDURE — 89060 EXAM SYNOVIAL FLUID CRYSTALS: CPT

## 2025-04-08 PROCEDURE — 85025 COMPLETE CBC W/AUTO DIFF WBC: CPT

## 2025-04-08 PROCEDURE — 36415 COLL VENOUS BLD VENIPUNCTURE: CPT

## 2025-04-08 PROCEDURE — 87205 SMEAR GRAM STAIN: CPT

## 2025-04-08 PROCEDURE — 84560 ASSAY OF URINE/URIC ACID: CPT

## 2025-04-08 PROCEDURE — 89051 BODY FLUID CELL COUNT: CPT

## 2025-04-08 PROCEDURE — 87070 CULTURE OTHR SPECIMN AEROBIC: CPT

## 2025-04-08 PROCEDURE — 85652 RBC SED RATE AUTOMATED: CPT

## 2025-04-08 PROCEDURE — 87075 CULTR BACTERIA EXCEPT BLOOD: CPT

## 2025-04-08 RX ORDER — TIZANIDINE HYDROCHLORIDE 2 MG/1
2 CAPSULE, GELATIN COATED ORAL 3 TIMES DAILY
Qty: 90 CAPSULE | Refills: 0 | Status: SHIPPED | OUTPATIENT
Start: 2025-04-08

## 2025-04-08 RX ORDER — HYDROCODONE BITARTRATE AND ACETAMINOPHEN 5; 325 MG/1; MG/1
1 TABLET ORAL EVERY 4 HOURS PRN
Qty: 30 TABLET | Refills: 0 | Status: SHIPPED | OUTPATIENT
Start: 2025-04-08
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
## 2025-04-08 NOTE — PROGRESS NOTES
Knee Musculoskeletal Exam  Large Joint Arthrocentesis: R knee  Date/Time: 4/8/2025 9:00 AM  Consent given by: patient  Site marked: site marked  Timeout: Immediately prior to procedure a time out was called to verify the correct patient, procedure, equipment, support staff and site/side marked as required   Supporting Documentation  Indications: pain and joint swelling   Procedure Details  Location: knee - R knee  Preparation: Patient was prepped and draped in the usual sterile fashion  Needle size: 18 G  Approach: superior  Aspirate amount: 25 mL  Aspirate: serous, cloudy and yellow  Analysis: fluid sample sent for laboratory analysis  Patient tolerance: patient tolerated the procedure well with no immediate complications

## 2025-04-09 LAB — URATE FLD-MCNC: 5.2 MG/DL

## 2025-04-11 LAB
BACTERIA SPEC AEROBE CULT: NORMAL
GRAM STN SPEC: NORMAL
GRAM STN SPEC: NORMAL

## 2025-04-13 LAB — BACTERIA SPEC ANAEROBE CULT: NORMAL

## 2025-04-14 ENCOUNTER — OFFICE VISIT (OUTPATIENT)
Dept: ORTHOPEDIC SURGERY | Facility: CLINIC | Age: 45
End: 2025-04-14
Payer: COMMERCIAL

## 2025-04-14 VITALS — WEIGHT: 271 LBS | HEIGHT: 73 IN | BODY MASS INDEX: 35.92 KG/M2

## 2025-04-14 DIAGNOSIS — Z96.659: ICD-10-CM

## 2025-04-14 DIAGNOSIS — Z96.651 S/P RIGHT UNICOMPARTMENTAL KNEE REPLACEMENT: Primary | ICD-10-CM

## 2025-04-14 DIAGNOSIS — T84.058A: ICD-10-CM
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
## 2025-04-17 PROBLEM — Z96.651 S/P REVISION OF TOTAL KNEE, RIGHT: Status: ACTIVE | Noted: 2025-04-17

## 2025-04-17 PROBLEM — T84.058A: Status: ACTIVE | Noted: 2025-04-14

## 2025-04-17 PROBLEM — Z96.659: Status: ACTIVE | Noted: 2025-04-14

## 2025-04-17 RX ORDER — PREGABALIN 150 MG/1
150 CAPSULE ORAL ONCE
OUTPATIENT
Start: 2025-04-17 | End: 2025-04-17

## 2025-04-17 RX ORDER — ACETAMINOPHEN 325 MG/1
1000 TABLET ORAL ONCE
OUTPATIENT
Start: 2025-04-17 | End: 2025-04-17

## 2025-04-17 RX ORDER — MELOXICAM 7.5 MG/1
15 TABLET ORAL ONCE
OUTPATIENT
Start: 2025-04-17 | End: 2025-04-17

## 2025-05-27 NOTE — CASE MANAGEMENT/SOCIAL WORK
Continued Stay Note   Geovanny     Patient Name: Mark Ballard  MRN: [id number]  Today's Date: 5/27/2025    Admit Date: (Not on file)        Discharge Plan       Row Name 05/27/25 1406       Plan    Plan Comments CM received a call from patient's wife Ale stating patient received a VM from  to arrange equipment and discuss physical therapy and she is calling back to answer these questions per her 's request. Wife states they have a BSC at home but patient will need a rolling walker at discharge and is agreeable for CM to arrange this through [employer] [facility] and referral was entered into [employer]. She states patient's preference is OP PT at [facility] and he would like [facility] again if at all possible and is agreeable for  to arrange this service. She had no other questions. KAYA called and spoke with Ro Mccurdy in rehab and she has scheduled an appointment with [facility] on 6/19/25 @ 8:00am. CM will follow.                   Discharge Codes    No documentation.                       Zoe Osorio RN

## 2025-06-04 ENCOUNTER — PRE-ADMISSION TESTING (OUTPATIENT)
Dept: PREADMISSION TESTING | Facility: HOSPITAL | Age: 45
End: 2025-06-04
Payer: COMMERCIAL

## 2025-06-04 VITALS
SYSTOLIC BLOOD PRESSURE: 113 MMHG | OXYGEN SATURATION: 98 % | BODY MASS INDEX: 37.51 KG/M2 | HEIGHT: 73 IN | HEART RATE: 86 BPM | RESPIRATION RATE: 16 BRPM | WEIGHT: 283 LBS | DIASTOLIC BLOOD PRESSURE: 71 MMHG

## 2025-06-04 DIAGNOSIS — T84.058A: ICD-10-CM

## 2025-06-04 DIAGNOSIS — Z96.651 S/P RIGHT UNICOMPARTMENTAL KNEE REPLACEMENT: ICD-10-CM

## 2025-06-04 DIAGNOSIS — Z96.659: ICD-10-CM

## 2025-06-04 LAB
ABO GROUP BLD: NORMAL
ANION GAP SERPL CALCULATED.3IONS-SCNC: 10.7 MMOL/L (ref 5–15)
APTT PPP: 29.3 SECONDS (ref 24.3–38.1)
BASOPHILS # BLD AUTO: 0.02 10*3/MM3 (ref 0–0.2)
BASOPHILS NFR BLD AUTO: 0.3 % (ref 0–1.5)
BILIRUB UR QL STRIP: ABNORMAL
BLD GP AB SCN SERPL QL: NEGATIVE
BUN SERPL-MCNC: 22.5 MG/DL (ref 6–20)
BUN/CREAT SERPL: 24.5 (ref 7–25)
CALCIUM SPEC-SCNC: 9.3 MG/DL (ref 8.6–10.5)
CHLORIDE SERPL-SCNC: 108 MMOL/L (ref 98–107)
CLARITY UR: CLEAR
CO2 SERPL-SCNC: 22.3 MMOL/L (ref 22–29)
COLOR UR: YELLOW
CREAT SERPL-MCNC: 0.92 MG/DL (ref 0.76–1.27)
DEPRECATED RDW RBC AUTO: 39 FL (ref 37–54)
EGFRCR SERPLBLD CKD-EPI 2021: 105.2 ML/MIN/1.73
EOSINOPHIL # BLD AUTO: 0.1 10*3/MM3 (ref 0–0.4)
EOSINOPHIL NFR BLD AUTO: 1.6 % (ref 0.3–6.2)
ERYTHROCYTE [DISTWIDTH] IN BLOOD BY AUTOMATED COUNT: 12.3 % (ref 12.3–15.4)
GLUCOSE SERPL-MCNC: 98 MG/DL (ref 65–99)
GLUCOSE UR STRIP-MCNC: ABNORMAL MG/DL
HBA1C MFR BLD: 5.36 % (ref 4.8–5.6)
HCT VFR BLD AUTO: 40.6 % (ref 37.5–51)
HGB BLD-MCNC: 13.9 G/DL (ref 13–17.7)
HGB UR QL STRIP.AUTO: NEGATIVE
IMM GRANULOCYTES # BLD AUTO: 0.02 10*3/MM3 (ref 0–0.05)
IMM GRANULOCYTES NFR BLD AUTO: 0.3 % (ref 0–0.5)
INR PPP: 1.02 (ref 0.9–1.1)
KETONES UR QL STRIP: ABNORMAL
LEUKOCYTE ESTERASE UR QL STRIP.AUTO: NEGATIVE
LYMPHOCYTES # BLD AUTO: 1.62 10*3/MM3 (ref 0.7–3.1)
LYMPHOCYTES NFR BLD AUTO: 25.6 % (ref 19.6–45.3)
MCH RBC QN AUTO: 29.8 PG (ref 26.6–33)
MCHC RBC AUTO-ENTMCNC: 34.2 G/DL (ref 31.5–35.7)
MCV RBC AUTO: 87.1 FL (ref 79–97)
MONOCYTES # BLD AUTO: 0.34 10*3/MM3 (ref 0.1–0.9)
MONOCYTES NFR BLD AUTO: 5.4 % (ref 5–12)
NEUTROPHILS NFR BLD AUTO: 4.22 10*3/MM3 (ref 1.7–7)
NEUTROPHILS NFR BLD AUTO: 66.8 % (ref 42.7–76)
NITRITE UR QL STRIP: NEGATIVE
NRBC BLD AUTO-RTO: 0 /100 WBC (ref 0–0.2)
PH UR STRIP.AUTO: 5.5 [PH] (ref 4.5–8)
PLATELET # BLD AUTO: 334 10*3/MM3 (ref 140–450)
PMV BLD AUTO: 10 FL (ref 6–12)
POTASSIUM SERPL-SCNC: 4.1 MMOL/L (ref 3.5–5.2)
PROT UR QL STRIP: NEGATIVE
PROTHROMBIN TIME: 13.7 SECONDS (ref 12.1–15)
QT INTERVAL: 378 MS
QTC INTERVAL: 416 MS
RBC # BLD AUTO: 4.66 10*6/MM3 (ref 4.14–5.8)
RH BLD: POSITIVE
SODIUM SERPL-SCNC: 141 MMOL/L (ref 136–145)
SP GR UR STRIP: 1.02 (ref 1–1.03)
T&S EXPIRATION DATE: NORMAL
UROBILINOGEN UR QL STRIP: ABNORMAL
WBC NRBC COR # BLD AUTO: 6.32 10*3/MM3 (ref 3.4–10.8)

## 2025-06-04 PROCEDURE — 81003 URINALYSIS AUTO W/O SCOPE: CPT

## 2025-06-04 PROCEDURE — 86850 RBC ANTIBODY SCREEN: CPT | Performed by: ORTHOPAEDIC SURGERY

## 2025-06-04 PROCEDURE — 36415 COLL VENOUS BLD VENIPUNCTURE: CPT

## 2025-06-04 PROCEDURE — 85610 PROTHROMBIN TIME: CPT

## 2025-06-04 PROCEDURE — 87081 CULTURE SCREEN ONLY: CPT

## 2025-06-04 PROCEDURE — 83036 HEMOGLOBIN GLYCOSYLATED A1C: CPT

## 2025-06-04 PROCEDURE — 80048 BASIC METABOLIC PNL TOTAL CA: CPT

## 2025-06-04 PROCEDURE — 86901 BLOOD TYPING SEROLOGIC RH(D): CPT | Performed by: ORTHOPAEDIC SURGERY

## 2025-06-04 PROCEDURE — 85025 COMPLETE CBC W/AUTO DIFF WBC: CPT

## 2025-06-04 PROCEDURE — 86900 BLOOD TYPING SEROLOGIC ABO: CPT | Performed by: ORTHOPAEDIC SURGERY

## 2025-06-04 PROCEDURE — 93005 ELECTROCARDIOGRAM TRACING: CPT

## 2025-06-04 PROCEDURE — 85730 THROMBOPLASTIN TIME PARTIAL: CPT

## 2025-06-04 NOTE — PAT
Pt here for PAT visit.  Pre-op tests completed, chg soap given, and instructions reviewed.  Instructed clears until 2 hrs prior to arrival time, voiced understanding. ERAS handout given and reviewed, ARROW pain pump handout given and reviewed

## 2025-06-04 NOTE — DISCHARGE INSTRUCTIONS
PRE-ADMISSION TESTING INSTRUCTIONS FOR TOTAL JOINT PATIENTS    Take these medications the morning of surgery with a small sip of water:  Omeprazole, Topamax      No aspirin, advil, aleve, ibuprofen, naproxen, diet pills, decongestants, or vitamin/herbal supplements for a week prior to your surgery.     Tylenol/Acetaminophen ok to take if needed.    Do not take any insulin or diabetes medications the morning of surgery.      General Instructions:    DO NOT EAT SOLID FOOD AFTER MIDNIGHT THE NIGHT BEFORE SURGERY. No gum, mints, or hard candy after midnight the night before surgery.  You may drink clear liquids the day of surgery up until 2 hours before your arrival time.  Clear liquids are liquids you can see through. Nothing RED in color.    Plain water    Sports drinks      Gelatin (Jell-O)  Fruit juices without pulp such as white grape juice and apple juice  Popsicles that contain no fruit or yogurt  Tea or coffee (no cream or milk added)    It is beneficial for you to have a clear drink that contains carbohydrates 2 hours prior to your arrival time.  DRINK A BOTTLE OF SPORTS DRINK 2 HOURS BEFORE YOUR ARRIVAL TIME. IF YOU ARE DIABETIC, DRINK A LOW OR NO SUGAR SPORTS DRINK. ANY COLOR EXCEPT RED.    Patients who avoid smoking, chewing tobacco and alcohol for 4 weeks prior to surgery have a reduced risk of post-operative complications.  If at all possible, quit smoking as many days before surgery as you can.    Do not smoke, use chewing tobacco or drink alcohol after midnight the day of surgery.    Bring your C-PAP/ BI-PAP machine if you use one.  Wear clean comfortable clothes.  Do not wear contact lenses, lotion, deodorant, or make-up.  Bring a case for your glasses if applicable.   You may brush your teeth the morning of surgery.  You may wear dentures/partials, do not put adhesive/glue on them.  Leave all other jewelry and valuables at home.  NOTIFY YOUR SURGEON IF YOU BECOME ILL, HAVE A FEVER, PRODUCTIVE COUGH, OR  CANNOT BE HERE THE DAY OF SURGERY.      Preventing a Surgical Site Infection:    Shower the night before and on the morning of surgery using the chlorhexidine soap you were given.  Use a clean washcloth with the soap.  Place clean sheets on your bed after showering the night before surgery. Do not use the CHG soap on your hair, face, or private areas. Wash your body gently for five (5) minutes. Do not scrub your skin.  Dry with a clean towel and dress in clean clothing.    Do not shave the surgical area for 10 days-2 weeks prior to surgery  because the razor can irritate skin and make it easier to develop an infection.  Make sure you, your family, and all healthcare providers clean their hands with soap and water or an alcohol based hand  before caring for you or your wound.      Day of surgery:    Your surgeon’s office will advise you of your arrival time for the day of surgery.    Upon arrival, a Pre-op nurse and Anesthesia provider will review your health history, obtain vital signs, and answer questions you may have. The anesthesia provider will also discuss the type of anesthesia that will be needed for your procedure, which may include general anesthesia. The only belongings needed at this time will be your home medications and if applicable your C-PAP/BI-PAP machine.  If you are staying overnight your family can leave the rest of your belongings in the car and bring them to your room later.  A Pre-op nurse will start an IV and you may receive medication in preparation for surgery, including something to help you relax.  Your family will be able to see you in the Pre-op area.  While you are in surgery your family should notify the waiting room  if they leave the waiting room area and provide a contact phone number.    If you have any questions, you can call the Pre-Admission Department at [phone] or your surgeon's office.    Please be aware that surgery does come with discomfort.   We want to make every effort to control your discomfort so please discuss any uncontrolled symptoms with your nurse.   Your doctor will most likely have prescribed pain medications.     You may have bruising or discomfort from the tourniquet used in surgery.     Please leave all luggage in the car the morning of surgery.  You will be transported to your hospital room following the recovery period.  Your family can get your luggage at that time.      You may receive a survey regarding the care you received. Your feedback is very important and will be used to collect the necessary data to help us to continue to provide excellent care.

## 2025-06-05 LAB — MRSA SPEC QL CULT: NORMAL

## 2025-06-13 ENCOUNTER — ANESTHESIA EVENT (OUTPATIENT)
Dept: PERIOP | Facility: HOSPITAL | Age: 45
End: 2025-06-13
Payer: COMMERCIAL

## 2025-06-16 ENCOUNTER — TELEMEDICINE (OUTPATIENT)
Dept: ORTHOPEDIC SURGERY | Facility: CLINIC | Age: 45
End: 2025-06-16
Payer: COMMERCIAL
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
## 2025-06-16 NOTE — PROGRESS NOTES
Cc: f/u right knee pain, failed partial knee arthroplasty    Patient presented to clinic today for preoperative history and physical visit in anticipation of upcoming scheduled right revision total knee arthroplasty.    I reviewed anatomy and a model of a total knee arthroplasty, as well as typical postoperative recovery of 6-12 months before maximal recovery, and possible need for rehabilitation stay after hospitalization. We also discussed risks, benefits, and alternatives of procedure with risks including but not limited to neurovascular damage, bleeding, infection, malalignment, chronic pian, failure of implants, osteolysis, loosening of implants, loss of motion, weakness, stiffness, instability, DVT, pulmonary embolus, death, stroke, complex regional pain syndrome, myocardial infarction, and need for additional procedures. Patient understood all these and had all questions answered before consenting to the procedure. No guarantees were given in regards to results from the surgery.  Patient has been medically optimize by his primary care physician.    Postoperative DVT prophylaxis will be with aspirin     All remaining questions answered today.

## 2025-06-17 ENCOUNTER — APPOINTMENT (OUTPATIENT)
Dept: GENERAL RADIOLOGY | Facility: HOSPITAL | Age: 45
End: 2025-06-17
Payer: COMMERCIAL

## 2025-06-17 ENCOUNTER — APPOINTMENT (OUTPATIENT)
Dept: ULTRASOUND IMAGING | Facility: HOSPITAL | Age: 45
End: 2025-06-17
Payer: COMMERCIAL

## 2025-06-17 ENCOUNTER — ANESTHESIA (OUTPATIENT)
Dept: PERIOP | Facility: HOSPITAL | Age: 45
End: 2025-06-17
Payer: COMMERCIAL

## 2025-06-17 ENCOUNTER — HOSPITAL ENCOUNTER (INPATIENT)
Facility: HOSPITAL | Age: 45
LOS: 1 days | Discharge: HOME | End: 2025-06-18
Attending: ORTHOPAEDIC SURGERY | Admitting: ORTHOPAEDIC SURGERY
Payer: COMMERCIAL

## 2025-06-17 DIAGNOSIS — T84.058A: ICD-10-CM

## 2025-06-17 DIAGNOSIS — Z96.659: ICD-10-CM

## 2025-06-17 DIAGNOSIS — Z96.651 S/P REVISION OF TOTAL KNEE, RIGHT: Primary | ICD-10-CM

## 2025-06-17 DIAGNOSIS — Z96.651 S/P RIGHT UNICOMPARTMENTAL KNEE REPLACEMENT: ICD-10-CM

## 2025-06-17 PROCEDURE — C1776 JOINT DEVICE (IMPLANTABLE): HCPCS | Performed by: ORTHOPAEDIC SURGERY

## 2025-06-17 PROCEDURE — C1713 ANCHOR/SCREW BN/BN,TIS/BN: HCPCS | Performed by: ORTHOPAEDIC SURGERY

## 2025-06-17 PROCEDURE — 25010000002 VANCOMYCIN 1 G RECONSTITUTED SOLUTION: Performed by: ORTHOPAEDIC SURGERY

## 2025-06-17 PROCEDURE — 87205 SMEAR GRAM STAIN: CPT | Performed by: ORTHOPAEDIC SURGERY

## 2025-06-17 PROCEDURE — 25010000002 PROPOFOL 200 MG/20ML EMULSION: Performed by: NURSE ANESTHETIST, CERTIFIED REGISTERED

## 2025-06-17 PROCEDURE — 0SPC0JZ REMOVAL OF SYNTHETIC SUBSTITUTE FROM RIGHT KNEE JOINT, OPEN APPROACH: ICD-10-PCS | Performed by: ORTHOPAEDIC SURGERY

## 2025-06-17 PROCEDURE — L1830 KO IMMOB CANVAS LONG PRE OTS: HCPCS | Performed by: ORTHOPAEDIC SURGERY

## 2025-06-17 PROCEDURE — 25010000002 BUPIVACAINE 0.5 % SOLUTION: Performed by: NURSE ANESTHETIST, CERTIFIED REGISTERED

## 2025-06-17 PROCEDURE — 25010000002 LIDOCAINE 2% SOLUTION: Performed by: NURSE ANESTHETIST, CERTIFIED REGISTERED

## 2025-06-17 PROCEDURE — 87070 CULTURE OTHR SPECIMN AEROBIC: CPT | Performed by: ORTHOPAEDIC SURGERY

## 2025-06-17 PROCEDURE — 73590 X-RAY EXAM OF LOWER LEG: CPT

## 2025-06-17 PROCEDURE — 25010000002 VANCOMYCIN 2 G RECONSTITUTED SOLUTION 1 EACH VIAL: Performed by: ORTHOPAEDIC SURGERY

## 2025-06-17 PROCEDURE — 25010000002 BUPIVACAINE (PF) 0.5 % SOLUTION

## 2025-06-17 PROCEDURE — 25010000002 MIDAZOLAM PER 1MG

## 2025-06-17 PROCEDURE — 25010000002 LIDOCAINE PF 2% 2 % SOLUTION: Performed by: NURSE ANESTHETIST, CERTIFIED REGISTERED

## 2025-06-17 PROCEDURE — 27487 REVISE/REPLACE KNEE JOINT: CPT | Performed by: ORTHOPAEDIC SURGERY

## 2025-06-17 PROCEDURE — 25010000002 CEFAZOLIN PER 500 MG: Performed by: ORTHOPAEDIC SURGERY

## 2025-06-17 PROCEDURE — 25010000002 DEXAMETHASONE PER 1 MG

## 2025-06-17 PROCEDURE — 27487 REVISE/REPLACE KNEE JOINT: CPT | Performed by: SPECIALIST/TECHNOLOGIST, OTHER

## 2025-06-17 PROCEDURE — 25010000002 ONDANSETRON PER 1 MG

## 2025-06-17 PROCEDURE — 73560 X-RAY EXAM OF KNEE 1 OR 2: CPT

## 2025-06-17 PROCEDURE — 25010000002 FAMOTIDINE 10 MG/ML SOLUTION

## 2025-06-17 PROCEDURE — 94761 N-INVAS EAR/PLS OXIMETRY MLT: CPT

## 2025-06-17 PROCEDURE — 0SRC0J9 REPLACEMENT OF RIGHT KNEE JOINT WITH SYNTHETIC SUBSTITUTE, CEMENTED, OPEN APPROACH: ICD-10-PCS | Performed by: ORTHOPAEDIC SURGERY

## 2025-06-17 PROCEDURE — 25010000002 BUPIVACAINE (PF) 0.25 % SOLUTION: Performed by: NURSE ANESTHETIST, CERTIFIED REGISTERED

## 2025-06-17 PROCEDURE — 27599 UNLISTED PX FEMUR/KNEE: CPT | Performed by: ORTHOPAEDIC SURGERY

## 2025-06-17 PROCEDURE — 25810000003 LACTATED RINGERS PER 1000 ML

## 2025-06-17 PROCEDURE — 25010000002 MORPHINE PER 10 MG: Performed by: ORTHOPAEDIC SURGERY

## 2025-06-17 PROCEDURE — 25810000003 SODIUM CHLORIDE 0.9 % SOLUTION

## 2025-06-17 PROCEDURE — 25810000003 SODIUM CHLORIDE 0.9 % SOLUTION 500 ML FLEX CONT: Performed by: ORTHOPAEDIC SURGERY

## 2025-06-17 PROCEDURE — 25010000002 VANCOMYCIN 1.75 G RECONSTITUTED SOLUTION 1 EACH VIAL: Performed by: ORTHOPAEDIC SURGERY

## 2025-06-17 DEVICE — IMPLANTABLE DEVICE
Type: IMPLANTABLE DEVICE | Site: KNEE | Status: FUNCTIONAL
Brand: REFOBACIN® BONE CEMENT R

## 2025-06-17 DEVICE — IMPLANTABLE DEVICE
Type: IMPLANTABLE DEVICE | Site: KNEE | Status: FUNCTIONAL
Brand: PERSONA® NATURAL TIBIA®

## 2025-06-17 DEVICE — IMPLANTABLE DEVICE
Type: IMPLANTABLE DEVICE | Site: KNEE | Status: FUNCTIONAL
Brand: PERSONA® VIVACIT-E®

## 2025-06-17 DEVICE — STEM FEM/KN PERSONA REV TPR SMOTH 14X75MM: Type: IMPLANTABLE DEVICE | Site: KNEE | Status: FUNCTIONAL

## 2025-06-17 DEVICE — IMPLANTABLE DEVICE
Type: IMPLANTABLE DEVICE | Site: KNEE | Status: FUNCTIONAL
Brand: PERSONA®

## 2025-06-17 DEVICE — VIOLET ANTIBACTERIAL POLYDIOXANONE, KNOTLESS TISSUE CONTROL DEVICE
Type: IMPLANTABLE DEVICE | Site: KNEE | Status: FUNCTIONAL
Brand: STRATAFIX

## 2025-06-17 DEVICE — IMPLANTABLE DEVICE
Type: IMPLANTABLE DEVICE | Site: KNEE | Status: FUNCTIONAL
Brand: PERSONA™

## 2025-06-17 DEVICE — KNOTLESS TISSUE CONTROL DEVICE, UNDYED UNIDIRECTIONAL (ANTIBACTERIAL) SYNTHETIC ABSORBABLE DEVICE
Type: IMPLANTABLE DEVICE | Site: KNEE | Status: FUNCTIONAL
Brand: STRATAFIX

## 2025-06-17 RX ORDER — PANTOPRAZOLE SODIUM 40 MG/1
40 TABLET, DELAYED RELEASE ORAL
Status: DISCONTINUED | OUTPATIENT
Start: 2025-06-18 | End: 2025-06-18 | Stop reason: HOSPADM

## 2025-06-17 RX ORDER — HYDROCODONE BITARTRATE AND ACETAMINOPHEN 5; 325 MG/1; MG/1
1 TABLET ORAL ONCE AS NEEDED
Status: DISCONTINUED | OUTPATIENT
Start: 2025-06-17 | End: 2025-06-17 | Stop reason: HOSPADM

## 2025-06-17 RX ORDER — ONDANSETRON 2 MG/ML
4 INJECTION INTRAMUSCULAR; INTRAVENOUS ONCE AS NEEDED
Status: COMPLETED | OUTPATIENT
Start: 2025-06-17 | End: 2025-06-17

## 2025-06-17 RX ORDER — DEXMEDETOMIDINE HYDROCHLORIDE 100 UG/ML
INJECTION, SOLUTION INTRAVENOUS
Status: COMPLETED | OUTPATIENT
Start: 2025-06-17 | End: 2025-06-17

## 2025-06-17 RX ORDER — LISINOPRIL 10 MG/1
10 TABLET ORAL DAILY
Status: DISCONTINUED | OUTPATIENT
Start: 2025-06-18 | End: 2025-06-18 | Stop reason: HOSPADM

## 2025-06-17 RX ORDER — TOPIRAMATE 25 MG/1
50 TABLET, FILM COATED ORAL 2 TIMES DAILY
Status: DISCONTINUED | OUTPATIENT
Start: 2025-06-17 | End: 2025-06-18 | Stop reason: HOSPADM

## 2025-06-17 RX ORDER — MIDAZOLAM HYDROCHLORIDE 2 MG/2ML
1 INJECTION, SOLUTION INTRAMUSCULAR; INTRAVENOUS
Status: COMPLETED | OUTPATIENT
Start: 2025-06-17 | End: 2025-06-17

## 2025-06-17 RX ORDER — BUPIVACAINE HYDROCHLORIDE 5 MG/ML
INJECTION, SOLUTION PERINEURAL
Status: COMPLETED | OUTPATIENT
Start: 2025-06-17 | End: 2025-06-17

## 2025-06-17 RX ORDER — ONDANSETRON 2 MG/ML
4 INJECTION INTRAMUSCULAR; INTRAVENOUS ONCE AS NEEDED
Status: DISCONTINUED | OUTPATIENT
Start: 2025-06-17 | End: 2025-06-17 | Stop reason: HOSPADM

## 2025-06-17 RX ORDER — SODIUM CHLORIDE 0.9 % (FLUSH) 0.9 %
10 SYRINGE (ML) INJECTION EVERY 12 HOURS SCHEDULED
Status: DISCONTINUED | OUTPATIENT
Start: 2025-06-17 | End: 2025-06-17 | Stop reason: HOSPADM

## 2025-06-17 RX ORDER — BUPIVACAINE HYDROCHLORIDE 2.5 MG/ML
INJECTION, SOLUTION EPIDURAL; INFILTRATION; INTRACAUDAL; PERINEURAL
Status: COMPLETED | OUTPATIENT
Start: 2025-06-17 | End: 2025-06-17

## 2025-06-17 RX ORDER — SODIUM CHLORIDE, SODIUM LACTATE, POTASSIUM CHLORIDE, CALCIUM CHLORIDE 600; 310; 30; 20 MG/100ML; MG/100ML; MG/100ML; MG/100ML
9 INJECTION, SOLUTION INTRAVENOUS CONTINUOUS
Status: DISCONTINUED | OUTPATIENT
Start: 2025-06-17 | End: 2025-06-17

## 2025-06-17 RX ORDER — NALOXONE HCL 0.4 MG/ML
0.4 VIAL (ML) INJECTION
Status: DISCONTINUED | OUTPATIENT
Start: 2025-06-17 | End: 2025-06-18 | Stop reason: HOSPADM

## 2025-06-17 RX ORDER — VANCOMYCIN HYDROCHLORIDE 1 G/20ML
INJECTION, POWDER, LYOPHILIZED, FOR SOLUTION INTRAVENOUS AS NEEDED
Status: DISCONTINUED | OUTPATIENT
Start: 2025-06-17 | End: 2025-06-17 | Stop reason: HOSPADM

## 2025-06-17 RX ORDER — ACETAMINOPHEN 325 MG/1
950 TABLET ORAL 3 TIMES DAILY
Status: DISCONTINUED | OUTPATIENT
Start: 2025-06-17 | End: 2025-06-18 | Stop reason: HOSPADM

## 2025-06-17 RX ORDER — SODIUM CHLORIDE 0.9 % (FLUSH) 0.9 %
10 SYRINGE (ML) INJECTION AS NEEDED
Status: DISCONTINUED | OUTPATIENT
Start: 2025-06-17 | End: 2025-06-17 | Stop reason: HOSPADM

## 2025-06-17 RX ORDER — OXYCODONE HYDROCHLORIDE 5 MG/1
10 TABLET ORAL EVERY 4 HOURS PRN
Status: DISCONTINUED | OUTPATIENT
Start: 2025-06-17 | End: 2025-06-18 | Stop reason: HOSPADM

## 2025-06-17 RX ORDER — ASPIRIN 325 MG
325 TABLET ORAL EVERY 12 HOURS SCHEDULED
Status: DISCONTINUED | OUTPATIENT
Start: 2025-06-18 | End: 2025-06-18 | Stop reason: HOSPADM

## 2025-06-17 RX ORDER — ONDANSETRON 4 MG/1
4 TABLET, ORALLY DISINTEGRATING ORAL EVERY 6 HOURS PRN
Status: DISCONTINUED | OUTPATIENT
Start: 2025-06-17 | End: 2025-06-18 | Stop reason: HOSPADM

## 2025-06-17 RX ORDER — LIDOCAINE HYDROCHLORIDE 20 MG/ML
INJECTION, SOLUTION EPIDURAL; INFILTRATION; INTRACAUDAL; PERINEURAL
Status: COMPLETED | OUTPATIENT
Start: 2025-06-17 | End: 2025-06-17

## 2025-06-17 RX ORDER — LIDOCAINE HYDROCHLORIDE 20 MG/ML
INJECTION, SOLUTION INFILTRATION; PERINEURAL AS NEEDED
Status: DISCONTINUED | OUTPATIENT
Start: 2025-06-17 | End: 2025-06-17 | Stop reason: SURG

## 2025-06-17 RX ORDER — MELOXICAM 7.5 MG/1
15 TABLET ORAL ONCE
Status: COMPLETED | OUTPATIENT
Start: 2025-06-17 | End: 2025-06-17

## 2025-06-17 RX ORDER — DEXAMETHASONE SODIUM PHOSPHATE 4 MG/ML
8 INJECTION, SOLUTION INTRA-ARTICULAR; INTRALESIONAL; INTRAMUSCULAR; INTRAVENOUS; SOFT TISSUE ONCE AS NEEDED
Status: COMPLETED | OUTPATIENT
Start: 2025-06-17 | End: 2025-06-17

## 2025-06-17 RX ORDER — TRANEXAMIC ACID 10 MG/ML
1000 INJECTION, SOLUTION INTRAVENOUS ONCE
Status: COMPLETED | OUTPATIENT
Start: 2025-06-17 | End: 2025-06-17

## 2025-06-17 RX ORDER — MAGNESIUM HYDROXIDE 1200 MG/15ML
LIQUID ORAL AS NEEDED
Status: DISCONTINUED | OUTPATIENT
Start: 2025-06-17 | End: 2025-06-17 | Stop reason: HOSPADM

## 2025-06-17 RX ORDER — FAMOTIDINE 10 MG/ML
20 INJECTION, SOLUTION INTRAVENOUS
Status: COMPLETED | OUTPATIENT
Start: 2025-06-17 | End: 2025-06-17

## 2025-06-17 RX ORDER — METHOCARBAMOL 500 MG/1
750 TABLET, FILM COATED ORAL 4 TIMES DAILY PRN
Status: DISCONTINUED | OUTPATIENT
Start: 2025-06-17 | End: 2025-06-18 | Stop reason: HOSPADM

## 2025-06-17 RX ORDER — ACETAMINOPHEN 500 MG
1000 TABLET ORAL ONCE
Status: COMPLETED | OUTPATIENT
Start: 2025-06-17 | End: 2025-06-17

## 2025-06-17 RX ORDER — FENTANYL CITRATE 50 UG/ML
25 INJECTION, SOLUTION INTRAMUSCULAR; INTRAVENOUS
Status: DISCONTINUED | OUTPATIENT
Start: 2025-06-17 | End: 2025-06-17 | Stop reason: HOSPADM

## 2025-06-17 RX ORDER — SODIUM CHLORIDE 9 MG/ML
40 INJECTION, SOLUTION INTRAVENOUS AS NEEDED
Status: DISCONTINUED | OUTPATIENT
Start: 2025-06-17 | End: 2025-06-17 | Stop reason: HOSPADM

## 2025-06-17 RX ORDER — PROPOFOL 10 MG/ML
INJECTION, EMULSION INTRAVENOUS CONTINUOUS PRN
Status: DISCONTINUED | OUTPATIENT
Start: 2025-06-17 | End: 2025-06-17 | Stop reason: SURG

## 2025-06-17 RX ORDER — LIDOCAINE HYDROCHLORIDE 10 MG/ML
0.5 INJECTION, SOLUTION EPIDURAL; INFILTRATION; INTRACAUDAL; PERINEURAL ONCE AS NEEDED
Status: DISCONTINUED | OUTPATIENT
Start: 2025-06-17 | End: 2025-06-17 | Stop reason: HOSPADM

## 2025-06-17 RX ORDER — DEXMEDETOMIDINE HYDROCHLORIDE 100 UG/ML
INJECTION, SOLUTION INTRAVENOUS AS NEEDED
Status: DISCONTINUED | OUTPATIENT
Start: 2025-06-17 | End: 2025-06-17 | Stop reason: SURG

## 2025-06-17 RX ORDER — SODIUM CHLORIDE, SODIUM LACTATE, POTASSIUM CHLORIDE, CALCIUM CHLORIDE 600; 310; 30; 20 MG/100ML; MG/100ML; MG/100ML; MG/100ML
100 INJECTION, SOLUTION INTRAVENOUS ONCE
Status: DISCONTINUED | OUTPATIENT
Start: 2025-06-17 | End: 2025-06-17 | Stop reason: HOSPADM

## 2025-06-17 RX ORDER — HYDROCHLOROTHIAZIDE 25 MG/1
25 TABLET ORAL DAILY
Status: DISCONTINUED | OUTPATIENT
Start: 2025-06-18 | End: 2025-06-18 | Stop reason: HOSPADM

## 2025-06-17 RX ORDER — OXYCODONE HYDROCHLORIDE 5 MG/1
5 TABLET ORAL EVERY 4 HOURS PRN
Status: DISCONTINUED | OUTPATIENT
Start: 2025-06-17 | End: 2025-06-18 | Stop reason: HOSPADM

## 2025-06-17 RX ORDER — ONDANSETRON 2 MG/ML
4 INJECTION INTRAMUSCULAR; INTRAVENOUS EVERY 6 HOURS PRN
Status: DISCONTINUED | OUTPATIENT
Start: 2025-06-17 | End: 2025-06-18 | Stop reason: HOSPADM

## 2025-06-17 RX ORDER — PREGABALIN 75 MG/1
150 CAPSULE ORAL ONCE
Status: COMPLETED | OUTPATIENT
Start: 2025-06-17 | End: 2025-06-17

## 2025-06-17 RX ORDER — MELOXICAM 7.5 MG/1
15 TABLET ORAL DAILY
Status: DISCONTINUED | OUTPATIENT
Start: 2025-06-17 | End: 2025-06-18 | Stop reason: HOSPADM

## 2025-06-17 RX ADMIN — BUPIVACAINE HYDROCHLORIDE 10 ML: 2.5 INJECTION, SOLUTION EPIDURAL; INFILTRATION; INTRACAUDAL; PERINEURAL at 09:00

## 2025-06-17 RX ADMIN — PROPOFOL 100 MCG/KG/MIN: 10 INJECTION, EMULSION INTRAVENOUS at 09:39

## 2025-06-17 RX ADMIN — LIDOCAINE HYDROCHLORIDE 3 ML: 20 INJECTION, SOLUTION EPIDURAL; INFILTRATION; INTRACAUDAL; PERINEURAL at 09:00

## 2025-06-17 RX ADMIN — MELOXICAM 15 MG: 7.5 TABLET ORAL at 08:04

## 2025-06-17 RX ADMIN — PREGABALIN 150 MG: 75 CAPSULE ORAL at 08:04

## 2025-06-17 RX ADMIN — ACETAMINOPHEN 1000 MG: 500 TABLET ORAL at 08:04

## 2025-06-17 RX ADMIN — MIDAZOLAM HYDROCHLORIDE 1 MG: 1 INJECTION, SOLUTION INTRAMUSCULAR; INTRAVENOUS at 08:31

## 2025-06-17 RX ADMIN — TOPIRAMATE 50 MG: 25 TABLET, FILM COATED ORAL at 20:22

## 2025-06-17 RX ADMIN — MORPHINE SULFATE 6 MG: 4 INJECTION, SOLUTION INTRAMUSCULAR; INTRAVENOUS at 22:44

## 2025-06-17 RX ADMIN — DEXAMETHASONE SODIUM PHOSPHATE 8 MG: 4 INJECTION, SOLUTION INTRA-ARTICULAR; INTRALESIONAL; INTRAMUSCULAR; INTRAVENOUS; SOFT TISSUE at 08:04

## 2025-06-17 RX ADMIN — ACETAMINOPHEN 975 MG: 325 TABLET ORAL at 20:21

## 2025-06-17 RX ADMIN — FAMOTIDINE 20 MG: 10 INJECTION INTRAVENOUS at 08:04

## 2025-06-17 RX ADMIN — LIDOCAINE HYDROCHLORIDE 100 MG: 20 INJECTION, SOLUTION INFILTRATION; PERINEURAL at 09:36

## 2025-06-17 RX ADMIN — OXYCODONE HYDROCHLORIDE 10 MG: 5 TABLET ORAL at 20:22

## 2025-06-17 RX ADMIN — DEXMEDETOMIDINE HYDROCHLORIDE 20 MCG: 100 INJECTION, SOLUTION INTRAVENOUS at 08:21

## 2025-06-17 RX ADMIN — VANCOMYCIN HYDROCHLORIDE 2000 MG: 2 INJECTION, POWDER, LYOPHILIZED, FOR SOLUTION INTRAVENOUS at 20:24

## 2025-06-17 RX ADMIN — SODIUM CHLORIDE 2000 MG: 9 INJECTION, SOLUTION INTRAVENOUS at 16:55

## 2025-06-17 RX ADMIN — TRANEXAMIC ACID 1000 MG: 10 INJECTION, SOLUTION INTRAVENOUS at 12:04

## 2025-06-17 RX ADMIN — BUPIVACAINE HYDROCHLORIDE 8 ML/HR: 5 INJECTION, SOLUTION EPIDURAL; INTRACAUDAL; PERINEURAL at 12:39

## 2025-06-17 RX ADMIN — BUPIVACAINE HYDROCHLORIDE 2 ML: 5 INJECTION, SOLUTION PERINEURAL at 09:24

## 2025-06-17 RX ADMIN — VANCOMYCIN HYDROCHLORIDE 1750 MG: 1.75 INJECTION, POWDER, LYOPHILIZED, FOR SOLUTION INTRAVENOUS at 08:03

## 2025-06-17 RX ADMIN — LIDOCAINE HYDROCHLORIDE 3 ML: 20 INJECTION, SOLUTION EPIDURAL; INFILTRATION; INTRACAUDAL; PERINEURAL at 08:30

## 2025-06-17 RX ADMIN — SODIUM CHLORIDE, POTASSIUM CHLORIDE, SODIUM LACTATE AND CALCIUM CHLORIDE 9 ML/HR: 600; 310; 30; 20 INJECTION, SOLUTION INTRAVENOUS at 08:05

## 2025-06-17 RX ADMIN — TRANEXAMIC ACID 1000 MG: 10 INJECTION, SOLUTION INTRAVENOUS at 09:47

## 2025-06-17 RX ADMIN — OXYCODONE HYDROCHLORIDE 10 MG: 5 TABLET ORAL at 15:38

## 2025-06-17 RX ADMIN — CEFAZOLIN 2000 MG: 2 INJECTION, POWDER, FOR SOLUTION INTRAVENOUS at 09:40

## 2025-06-17 RX ADMIN — ONDANSETRON 4 MG: 2 INJECTION INTRAMUSCULAR; INTRAVENOUS at 08:04

## 2025-06-17 RX ADMIN — MIDAZOLAM HYDROCHLORIDE 1 MG: 1 INJECTION, SOLUTION INTRAMUSCULAR; INTRAVENOUS at 08:25

## 2025-06-17 RX ADMIN — DEXMEDETOMIDINE 15 MCG: 200 INJECTION, SOLUTION INTRAVENOUS at 08:33

## 2025-06-17 RX ADMIN — MORPHINE SULFATE 6 MG: 4 INJECTION, SOLUTION INTRAMUSCULAR; INTRAVENOUS at 16:50

## 2025-06-17 RX ADMIN — DEXMEDETOMIDINE HYDROCHLORIDE 30 MCG: 100 INJECTION, SOLUTION INTRAVENOUS at 09:00

## 2025-06-17 RX ADMIN — MELOXICAM 15 MG: 7.5 TABLET ORAL at 15:18

## 2025-06-17 RX ADMIN — BUPIVACAINE HYDROCHLORIDE 20 ML: 2.5 INJECTION, SOLUTION EPIDURAL; INFILTRATION; INTRACAUDAL; PERINEURAL at 08:33

## 2025-06-17 RX ADMIN — ACETAMINOPHEN 975 MG: 325 TABLET ORAL at 15:39

## 2025-06-17 NOTE — SIGNIFICANT NOTE
06/17/25 1442   OTHER   Discipline physical therapist   Rehab Time/Intention   Session Not Performed other (see comments)  (pt with decreased sensation/motor activity in bilateral LEs from spinal block.  will follow up in AM.)

## 2025-06-17 NOTE — SIGNIFICANT NOTE
06/17/25 1437   OTHER   Discipline occupational therapist   Rehab Time/Intention   Session Not Performed other (see comments)  (OT evaluation attempted. Patient with decreaesd B LE sensation and motor control from surgical block. Will attempt again in AM.)

## 2025-06-17 NOTE — ANESTHESIA PREPROCEDURE EVALUATION
Anesthesia Evaluation     Patient summary reviewed and Nursing notes reviewed   no history of anesthetic complications:   NPO Solid Status: > 8 hours  NPO Liquid Status: > 2 hours           Airway   Mallampati: I  TM distance: >3 FB  Neck ROM: full  No difficulty expected  Dental - normal exam     Pulmonary - negative pulmonary ROS and normal exam    breath sounds clear to auscultation  Cardiovascular - normal exam  Exercise tolerance: good (4-7 METS)    ECG reviewed  Rhythm: regular  Rate: normal    (+) hypertension well controlled 2 medications or greater      Neuro/Psych  GI/Hepatic/Renal/Endo    (+) obesity, GERD well controlled  (-) morbid obesity    Musculoskeletal     (+) back pain  Abdominal   (+) obese   Substance History   (+) alcohol use     OB/GYN negative ob/gyn ROS         Other   arthritis,                 Anesthesia Plan    ASA 2     spinal and regional     intravenous induction     Anesthetic plan, risks, benefits, and alternatives have been provided, discussed and informed consent has been obtained with: patient.  Pre-procedure education provided  Use of blood products discussed with patient  Consented to blood products.      CODE STATUS:

## 2025-06-17 NOTE — OP NOTE
Date of Surgery: 6/17/2025    Preoperative diagnosis: right knee failed partial knee arthroplasty    Postoperative diagnosis: right knee failed partial knee arthroplasty    Procedure:  1.right total knee arthroplasty revision of 2 components including patella resurfacing  2. Intraoperative use of kinetic knee balance sensor for implant stability during total knee arthroplasty    Surgical Approach: Knee Medial Parapatellar    Surgeon: Daniel Coffman.: Assistant: Saul Miranda CSA; Roney Sanchez CSA was responsible for performing the following activities: Retraction, Irrigation, Closing, and Placing Dressing and their skilled assistance was necessary for the success of this case.    Anesthesia: spinal, regional    Estimated blood loss: <500ml    Fluids: per anesthesia    Specimens: synovial fluid culture x 1    Complications: None    Drains: None    Tourniquet time: Tourniquet Times:     Inflated: 6/17/2025 10:00 AM     Deflated: 6/17/2025 12:02 PM    Implants used: [employer] total knee arthroplasty system with a size 35 patella, size F tibia with stem extension, size 9 cruciate retaining femoral component, 14 mm highly cross-linked medial congruent polyethylene insert, antibiotic cement    Examination under anesthesia: right knee passive range of motion 3-115°, varus alignment, no open wounds lacerations or abrasions over knee, prior midline incision well-healed, stable to varus and valgus stress at 3 and 30°, 2+ dorsalis pedis pulse.    Indications for procedure: Patient is a pleasant 44 y.o. male who has had significant pain to right knee over the last several months with previous history of right partial knee arthroplasty that had initially done well.  He was continued to have significant increasing pain and nuclear bone scan as well as imaging indicated concern for failure of tibial bone beneath the tibial sided baseplate of the partial knee arthroplasty as well as progressive arthritis change in  the patellofemoral compartment particular.  Given failure of conservative treatment with intra-articular injections, bracing, home exercise program, activity modification, anti-inflammatory medications, he wished to proceed with surgery. Has had persistent pain limiting activities of daily living and even has had pain at rest. We discussed treatment options and patient wished to proceed with above-mentioned surgery. Was explained details of procedure as well as risks benefits and alternatives as documented on history and physical and had all questions answered. No guarantees were given in regards to results of the surgery.    Details of procedure: Patient was seen, evaluated, and cleared for surgery by anesthesia. Had been medically optimized by primary care physician. Patient was met in the preoperative hold area, operative site was marked, consent was reviewed, history and physical was updated, and preoperative labs were reviewed. Regional block and spinal were placed per anesthesia and patient was taken to the operating room and placed in supine position on a regular OR table. Examination under anesthesia was carried out this time. Nonsterile tourniquet was then applied to right lower extremity. Patient was secured to the table with a waist strap and all bony prominences were well-padded. right lower extremity was then sterilely prepped and draped in standard fashion.    Formal timeout was completed including confirmation of history and physical, operative consent, operative site, patient identification number, and preoperative antibiotics administration. right leg was then exsanguinated and tourniquet inflated to 250 mmHg. Procedure was then begun by following previous anterior incision with longitudinal incision over the anterior aspect of the right knee through skin and subcutaneous tissue with 15 blade. Minimal subcutaneous flaps were developed at this point in time, and standard medial parapatellar  arthrotomy was then created at this point. Portion of suprapatellar and infrapatellar fat pad were resected this point in time. Medial tibial peel was carried out in order to allow for further exposure the knee. ACL was transected.    Patella was then everted and measured with a caliper. Patellar reamer was then used to create initial retropatellar cut followed by completion of cut with a oscillating saw in standard fashion and use of rongeur. Patella was sized as a 35 and patella guard was placed at this time.    Attention was then turned to the distal femur with the knee being brought into a flexed position.  Reference pin for I-Assist navigational system was placed in the distal femur in-line with Whitesides line in retrograde fashion.  Navigational distal femoral cutting block was positioned at this time as well and calibrated with multiple planes of knee and hip motion carried out to set reference positions for navigation device.  Navigational device was then adjusted to 0 degrees valgus cut on distal femur and 3 degrees flexion cut on distal femur.  Distal femoral cutting block was pinned into position at this site, navigational device removed. Depth of the resection was checked with a sickle and noted to be appropriate. Additional pin was placed for security of the cutting block and oscillating saw was then used to create a distal femoral cut in standard fashion while collateral ligaments were protected with Z retractors. Bone was removed and measured at this time.     Attention was then turned to removal of the femoral component from the partial knee arthroplasty with microsagittal saw used around all margins of the implant at this point in time.  Curved osteotome was then used to slowly elevate the implant from the distal femoral surface.  This was noted to be slightly more distal than the cut surface from the I assist device with planned resection for the total knee arthroplasty.  Majority of bone was  preserved at this point in time with a small region of defect noted in the posterior lateral portion of the medial femoral condyle.    Distal femoral cut validation was completed with navigational device at this point time as well confirming appropriate cut consistent with set parameters as noted above. Pins and cutting block were removed as well. Contents of the notch were then resected including the ACL. Posterior retractor was then placed and tibia was subluxated anteriorly.     Attention was then turned to the proximal tibia.  Initial attention was turned to removal of the polyethylene insert into the tibial component of the partial knee arthroplasty.  Flexible osteotome was then used around the undersurface of the tibial component to allow for removal of the tibial component at this point in time.  The anterior medial third of the medial tibial plateau was noted to have moderate bony softening.  Greater than 70% of the medial tibial plateau surface was noted to have intact bone at this time with very minimal bony resection with the extraction of the tibial component.     Additional release of lateral tissues was completed at this time to allow for appropriate visualization of the proximal tibia articular surface. I-Assist navigational device was then pinned in position over the tibial tubercle with 3 pins at this time, external guide was placed in line with the tibial crest and center of the ankle joint and clamped into position over the ankle while proximal guide pins were impacted into the top of the tibia.  Navigational pods were then calibrated with range of motion of the hip and once noted to be appropriate, external guide from tibia was removed.  Adjustments were made to the navigational device to place the cut in 1 degree varus and 3 degrees posterior slope.  Tibial cutting block resection depth was set with a sickle to ensure minimal resection of the medial surface and approximately 11 mm of resection  of the lateral proximal tibial plateau. Block was pinned into position at this time, and alignment assessed with the drop betsy noted to be in line with tibial crest, medial third of tibial tubercle, and center of the ankle joint. Oscillating saw was then used to complete the proximal tibial cut while collateral ligaments were protected with Z retractors. Bone fragments were removed and measured.  Tibial cut was then validated with validation device from navigational system and confirmed to be within reasonable position of above-mentioned set parameters for the proximal tibia cut. Knee was brought into full extension with extension gap being checked with spacer block at this time and noted be acceptable with knee brought into full extension, stable medial and lateral collateral stability at full extension.  Navigational guide was then removed at this point time.   Attention was then returned to the distal femur with a femoral sizing guide being placed, transverse epicondylar axis and Whitesides line being assessed and used to determine appropriate external rotation of 0 degrees.  Femoral sizing guide was secured to the distal femur with 2 screws, sizing caliper was adjusted to the anterior femur and femur was sized at a 9.  2 drill holes were made through the sizing guide which was then removed including 2 screws. Size 9 femoral cutting block was then impacted onto the distal femoral cut surface and pinned into position. Sickle was used to check the anterior cut and there was no evidence of anticipated notching. Collateral ligaments were protected with Z retractors while anterior, posterior, and chamfer cuts were created in standard fashion with oscillating saw. Femoral cutting block was removed at this time as well as bone fragments. Posterior capsule was stripped at this time. Size 9 femoral trial was placed. Size F tibial baseplate trial with 14 mm polyethylene trial was inserted. Knee was then ranged from 0-125°,  good varus and valgus stability at full extension and 30° as well as throughout mid flexion with good AP translation at 90° of flexion noted.  Patella was everted at this point in time, 35 mm patella reaming guide was placed and lug holes were drilled for patella at this point. Patella trial was placed and patella was noted to track in midline with no evidence of subluxation. Knee was ranged from full extension to full flexion and tibial rotation was noted with midline of tibial trial being marked with Bovie electrocautery at the proximal tibia. Femoral and patellar and tibial trials were removed at this point in time and tibia was subluxated anteriorly with posterior retractor. Tibial trial baseplate was placed once again, position confirmed with drop betsy as well as with previous marking for tibial rotation and assessing for bony coverage of implant. Once position of tibial baseplate was noted to be appropriate, 2 pins were placed at this time, and reamer and punch were then used through the tibial baseplate.  All trial components were once again removed at this time and pulsatile lavage was used to thoroughly clean all bony cut surfaces as well as subcutaneous tissue. Final implants were opened on the back table this time. Cement was prepared on the back table and was applied to the cut surfaces of the distal femur, proximal tibia, and patella as well as to the backside of the implants. Tibial component was placed first followed by distal femur followed by patella. Extraneous cement was removed with freer at this time. Once all implants were in position knee was brought into full extension with axial compression to allow for cement to cure fully.  Once cement was completely hardened, trial polyethylene insert was assessed, range of motion of knee from 0-125° was achieved with good varus and valgus stability throughout range of motion and good AP translation at 90° of flexion. Thus a 14 mm polyethylene insert was  opened on the back table, inserted and locked in appropriately into the tibial baseplate. Knee was thoroughly irrigated with a second evaluation for any additional bone fragments or cement particles. Knee was then thoroughly irrigated with Betadine solution followed by pulsatile lavage. 1 g of vancomycin powder was added to deep and subcutaneous tissue at this time.  Attention was then turned to closure of the wound with running self locking #2 suture ×1, 2-0 Vicryls in inverted fashion for deep subcutaneous closure, 3-0 running self locking strata-fix suture, and glue and mesh for skin. Wound was dressed with Telfa, 4 x 4 gauze, web roll, ABD pad, Ace wrap, and patient was placed in knee immobilizer and given ice pack. At the end of procedure all lap, needle and sponge counts were correct ×2. Patient had brisk cap refill all digits right lower extremity, compartments are soft and easily compressible at the end of the procedure.    Disposition: Patient was taken to recovery room in stable condition. Will be admitted for pain control and initiation of therapy, weight-bear as tolerated right lower extremity, DVT prophylaxis will be started on postoperative day #1 with aspirin. Results of the procedure were discussed immediately postoperatively with patient's family and they had all questions answered at that time.

## 2025-06-17 NOTE — ANESTHESIA PROCEDURE NOTES
Peripheral Block    Pre-sedation assessment completed: 6/17/2025 8:24 AM    Patient reassessed immediately prior to procedure    Patient location during procedure: pre-op  Start time: 6/17/2025 8:30 AM  Stop time: 6/17/2025 8:33 AM  Reason for block: at surgeon's request  Performed by  CRNA/CAA: Rupal Parker, MARGUERITESRNA: Anni Kim SRNA  Preanesthetic Checklist  Completed: patient identified, IV checked, site marked, risks and benefits discussed, surgical consent, monitors and equipment checked, pre-op evaluation and timeout performed  Prep:  Pt Position: supine  Sterile barriers:cap, gloves, mask and washed/disinfected hands  Prep: ChloraPrep  Patient monitoring: blood pressure monitoring, continuous pulse oximetry and EKG  Procedure    Sedation: yes  Performed under: local infiltration  Guidance:ultrasound guided    ULTRASOUND INTERPRETATION.  Using ultrasound guidance a 21 G gauge needle was placed in close proximity to the nerve, at which point, under ultrasound guidance anesthetic was injected in the area of the nerve and spread of the anesthesia was seen on ultrasound in close proximity thereto.  There were no abnormalities seen on ultrasound; a digital image was taken; and the patient tolerated the procedure with no complications. Images:still images obtained, printed/placed on chart    Laterality:right  Block Type:iPack  Injection Technique:single-shot  Needle Type:echogenic  Needle Gauge:21 G  Resistance on Injection: none    Medications Used: bupivacaine PF (MARCAINE) injection 0.25% - Perineural   20 mL - 6/17/2025 8:33:00 AM  dexmedetomidine HCl (PRECEDEX) injection - Perineural   15 mcg - 6/17/2025 8:33:00 AM  lidocaine PF 2% (XYLOCAINE) injection - Infiltration   3 mL - 6/17/2025 8:30:00 AM      Post Assessment  Injection Assessment: negative aspiration for heme, no paresthesia on injection and incremental injection  Patient Tolerance:comfortable throughout  block  Complications:no  Performed by: Anni Kim, SRNA

## 2025-06-17 NOTE — ANESTHESIA PROCEDURE NOTES
Peripheral Block    Pre-sedation assessment completed: 6/17/2025 8:40 AM    Patient reassessed immediately prior to procedure    Patient location during procedure: pre-op  Start time: 6/17/2025 8:44 AM  Stop time: 6/17/2025 9:00 AM  Reason for block: at surgeon's request and post-op pain management  Performed by  CRNA/CAA: Rupal Parker, AMELIANA: Anni Kim SRNA  Preanesthetic Checklist  Completed: patient identified, IV checked, site marked, risks and benefits discussed, surgical consent, monitors and equipment checked, pre-op evaluation and timeout performed  Prep:  Pt Position: supine  Sterile barriers:cap, gloves, mask and washed/disinfected hands  Prep: ChloraPrep  Patient monitoring: blood pressure monitoring, continuous pulse oximetry and EKG  Procedure    Sedation: yes  Performed under: local infiltration  Guidance:ultrasound guided    ULTRASOUND INTERPRETATION.  Using ultrasound guidance a 21 G gauge needle was placed in close proximity to the nerve, at which point, under ultrasound guidance anesthetic was injected in the area of the nerve and spread of the anesthesia was seen on ultrasound in close proximity thereto.  There were no abnormalities seen on ultrasound; a digital image was taken; and the patient tolerated the procedure with no complications. Images:still images obtained, printed/placed on chart    Laterality:right  Block Type:adductor canal block  Injection Technique:catheter  Needle Type:echogenic  Needle Gauge:21 G  Resistance on Injection: none  Catheter Size:20 G    Medications Used: dexmedetomidine HCl (PRECEDEX) injection - Perineural   30 mcg - 6/17/2025 9:00:00 AM  bupivacaine PF (MARCAINE) injection 0.25% - Perineural   10 mL - 6/17/2025 9:00:00 AM  lidocaine PF 2% (XYLOCAINE) injection - Infiltration   3 mL - 6/17/2025 9:00:00 AM      Post Assessment  Injection Assessment: negative aspiration for heme, no paresthesia on injection and incremental  injection  Patient Tolerance:comfortable throughout block  Complications:no  Performed by: Anni Kim, SRNA

## 2025-06-17 NOTE — ANESTHESIA PROCEDURE NOTES
Spinal Block    Pre-sedation assessment completed: 6/17/2025 9:19 AM    Patient reassessed immediately prior to procedure    Start Time: 6/17/2025 9:20 AM  Stop Time: 6/17/2025 9:20 AM  Indication:at surgeon's request and post-op pain management  Performed By  MARGUERITE/GULSHAN: Rupal Parker CRNASRNA: Anni Kim SRNA  Preanesthetic Checklist  Completed: patient identified, IV checked, site marked, risks and benefits discussed, surgical consent, monitors and equipment checked, pre-op evaluation and timeout performed  Spinal Block Prep:  Patient Position:sitting  Sterile Tech:cap, gloves, mask and sterile barriers  Prep:Chloraprep  Patient Monitoring:blood pressure monitoring, continuous pulse oximetry and EKG    Spinal Block Procedure  Approach:midline  Guidance:landmark technique and palpation technique  Location:L2-L3  Needle Type:Sprotte  Needle Gauge:25 G  Placement of Spinal needle event:cerebrospinal fluid aspirated  Paresthesia: no  Fluid Appearance:clear  Medications: bupivacaine (MARCAINE) injection 0.5% - Perineural   2 mL - 6/17/2025 9:24:00 AM   Post Assessment  Patient Tolerance:patient tolerated the procedure well with no apparent complications  Complications no

## 2025-06-17 NOTE — ANESTHESIA POSTPROCEDURE EVALUATION
Patient: Mark Ballard    Procedure Summary       Date: 06/17/25 Room / Location:  [facility] 2 / [facility]    Anesthesia Start: 0932 Anesthesia Stop: 1234    Procedure: TOTAL KNEE ARTHROPLASTY REVISION (Right: Knee) Diagnosis:       S/P right unicompartmental knee replacement      Osteolysis around knee prosthesis      (S/P right unicompartmental knee replacement [Z96.651])      (Osteolysis around knee prosthesis [T84.058A, Z96.659])    Surgeons: Job Sanchez MD Provider: Erin Badillo CRNA    Anesthesia Type: spinal, regional ASA Status: 2            Anesthesia Type: spinal, regional    Vitals  Vitals Value Taken Time   BP 98/70 06/17/25 13:15   Temp 97.6 °F (36.4 °C) 06/17/25 12:35   Pulse 67 06/17/25 13:19   Resp 19 06/17/25 13:10   SpO2 97 % 06/17/25 13:19   Vitals shown include unfiled device data.        Post Anesthesia Care and Evaluation    Patient location during evaluation: PACU  Patient participation: complete - patient participated  Level of consciousness: awake and alert  Pain score: 0  Pain management: satisfactory to patient    Airway patency: patent  Anesthetic complications: No anesthetic complications  PONV Status: none  Cardiovascular status: acceptable  Respiratory status: acceptable  Hydration status: acceptable
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
## 2025-06-18 VITALS
WEIGHT: 286.6 LBS | OXYGEN SATURATION: 96 % | TEMPERATURE: 97.5 F | BODY MASS INDEX: 37.98 KG/M2 | HEART RATE: 76 BPM | RESPIRATION RATE: 18 BRPM | SYSTOLIC BLOOD PRESSURE: 107 MMHG | DIASTOLIC BLOOD PRESSURE: 59 MMHG | HEIGHT: 73 IN

## 2025-06-18 LAB
ANION GAP SERPL CALCULATED.3IONS-SCNC: 9.6 MMOL/L (ref 5–15)
BASOPHILS # BLD AUTO: 0.02 10*3/MM3 (ref 0–0.2)
BASOPHILS NFR BLD AUTO: 0.2 % (ref 0–1.5)
BUN SERPL-MCNC: 14.7 MG/DL (ref 6–20)
BUN/CREAT SERPL: 17.5 (ref 7–25)
CALCIUM SPEC-SCNC: 8.1 MG/DL (ref 8.6–10.5)
CHLORIDE SERPL-SCNC: 109 MMOL/L (ref 98–107)
CO2 SERPL-SCNC: 22.4 MMOL/L (ref 22–29)
CREAT SERPL-MCNC: 0.84 MG/DL (ref 0.76–1.27)
DEPRECATED RDW RBC AUTO: 40.9 FL (ref 37–54)
EGFRCR SERPLBLD CKD-EPI 2021: 110.3 ML/MIN/1.73
EOSINOPHIL # BLD AUTO: 0.11 10*3/MM3 (ref 0–0.4)
EOSINOPHIL NFR BLD AUTO: 1.1 % (ref 0.3–6.2)
ERYTHROCYTE [DISTWIDTH] IN BLOOD BY AUTOMATED COUNT: 12.5 % (ref 12.3–15.4)
GLUCOSE SERPL-MCNC: 129 MG/DL (ref 65–99)
HCT VFR BLD AUTO: 34.9 % (ref 37.5–51)
HGB BLD-MCNC: 11.8 G/DL (ref 13–17.7)
IMM GRANULOCYTES # BLD AUTO: 0.04 10*3/MM3 (ref 0–0.05)
IMM GRANULOCYTES NFR BLD AUTO: 0.4 % (ref 0–0.5)
LYMPHOCYTES # BLD AUTO: 1.91 10*3/MM3 (ref 0.7–3.1)
LYMPHOCYTES NFR BLD AUTO: 19.3 % (ref 19.6–45.3)
MCH RBC QN AUTO: 30.2 PG (ref 26.6–33)
MCHC RBC AUTO-ENTMCNC: 33.8 G/DL (ref 31.5–35.7)
MCV RBC AUTO: 89.3 FL (ref 79–97)
MONOCYTES # BLD AUTO: 0.84 10*3/MM3 (ref 0.1–0.9)
MONOCYTES NFR BLD AUTO: 8.5 % (ref 5–12)
NEUTROPHILS NFR BLD AUTO: 6.99 10*3/MM3 (ref 1.7–7)
NEUTROPHILS NFR BLD AUTO: 70.5 % (ref 42.7–76)
NRBC BLD AUTO-RTO: 0 /100 WBC (ref 0–0.2)
PLATELET # BLD AUTO: 293 10*3/MM3 (ref 140–450)
PMV BLD AUTO: 10.1 FL (ref 6–12)
POTASSIUM SERPL-SCNC: 3.6 MMOL/L (ref 3.5–5.2)
RBC # BLD AUTO: 3.91 10*6/MM3 (ref 4.14–5.8)
SODIUM SERPL-SCNC: 141 MMOL/L (ref 136–145)
WBC NRBC COR # BLD AUTO: 9.91 10*3/MM3 (ref 3.4–10.8)

## 2025-06-18 PROCEDURE — 97165 OT EVAL LOW COMPLEX 30 MIN: CPT

## 2025-06-18 PROCEDURE — 25010000002 CEFAZOLIN PER 500 MG: Performed by: ORTHOPAEDIC SURGERY

## 2025-06-18 PROCEDURE — 85025 COMPLETE CBC W/AUTO DIFF WBC: CPT | Performed by: ORTHOPAEDIC SURGERY

## 2025-06-18 PROCEDURE — 97161 PT EVAL LOW COMPLEX 20 MIN: CPT

## 2025-06-18 PROCEDURE — 80048 BASIC METABOLIC PNL TOTAL CA: CPT | Performed by: ORTHOPAEDIC SURGERY

## 2025-06-18 RX ORDER — MELOXICAM 15 MG/1
15 TABLET ORAL DAILY
Qty: 30 TABLET | Refills: 0 | Status: SHIPPED | OUTPATIENT
Start: 2025-06-18 | End: 2025-06-30

## 2025-06-18 RX ORDER — ASPIRIN 325 MG
TABLET ORAL
Qty: 42 TABLET | Refills: 0 | Status: SHIPPED | OUTPATIENT
Start: 2025-06-18 | End: 2025-07-16

## 2025-06-18 RX ORDER — AMOXICILLIN 250 MG
1 CAPSULE ORAL DAILY
Qty: 30 TABLET | Refills: 0 | Status: SHIPPED | OUTPATIENT
Start: 2025-06-18

## 2025-06-18 RX ORDER — OXYCODONE AND ACETAMINOPHEN 5; 325 MG/1; MG/1
1 TABLET ORAL EVERY 4 HOURS PRN
Qty: 42 TABLET | Refills: 0 | Status: SHIPPED | OUTPATIENT
Start: 2025-06-18 | End: 2025-06-19

## 2025-06-18 RX ORDER — ONDANSETRON 4 MG/1
4 TABLET, ORALLY DISINTEGRATING ORAL EVERY 8 HOURS PRN
Qty: 20 TABLET | Refills: 0 | Status: SHIPPED | OUTPATIENT
Start: 2025-06-18 | End: 2025-06-30

## 2025-06-18 RX ADMIN — HYDROCHLOROTHIAZIDE 25 MG: 25 TABLET ORAL at 09:06

## 2025-06-18 RX ADMIN — ASPIRIN 325 MG: 325 TABLET ORAL at 09:02

## 2025-06-18 RX ADMIN — TOPIRAMATE 50 MG: 25 TABLET, FILM COATED ORAL at 09:02

## 2025-06-18 RX ADMIN — ACETAMINOPHEN 975 MG: 325 TABLET ORAL at 09:02

## 2025-06-18 RX ADMIN — SODIUM CHLORIDE 2000 MG: 9 INJECTION, SOLUTION INTRAVENOUS at 01:01

## 2025-06-18 RX ADMIN — PANTOPRAZOLE SODIUM 40 MG: 40 TABLET, DELAYED RELEASE ORAL at 05:32

## 2025-06-18 RX ADMIN — OXYCODONE HYDROCHLORIDE 10 MG: 5 TABLET ORAL at 01:37

## 2025-06-18 RX ADMIN — OXYCODONE HYDROCHLORIDE 10 MG: 5 TABLET ORAL at 14:06

## 2025-06-18 RX ADMIN — OXYCODONE HYDROCHLORIDE 10 MG: 5 TABLET ORAL at 05:32

## 2025-06-18 RX ADMIN — MELOXICAM 15 MG: 7.5 TABLET ORAL at 09:02

## 2025-06-18 RX ADMIN — OXYCODONE HYDROCHLORIDE 10 MG: 5 TABLET ORAL at 09:29

## 2025-06-18 RX ADMIN — LISINOPRIL 10 MG: 10 TABLET ORAL at 09:06

## 2025-06-18 NOTE — PLAN OF CARE
Goal Outcome Evaluation:           Progress: improving  Outcome Evaluation: vss, pt ambulated in bee w/ 1 assist, gait belt, and walker. ice applied to knee. IV antibiotics given per MAR, only complains of pain- pain medication given per MAR, wife at bedside, all care explained and questions answered.

## 2025-06-18 NOTE — PLAN OF CARE
Goal Outcome Evaluation:  Plan of Care Reviewed With: patient, spouse           Outcome Evaluation: Physical therapy evaluation complete.  Patient performs bed mobility and transfers with modified independence and gait x400 feet with SBA with rolling walker.  Patient ascends/descends 5 steps with 1 handrail, CGA.  Patient completes 10 reps of LE exercises, written handout provided.  Patient reports no concerns regarding return home at this time, recommend outpatient PT services at discharge, no further PT needs in acute care setting.    Anticipated Discharge Disposition (PT): home with outpatient therapy services

## 2025-06-18 NOTE — DISCHARGE INSTR - APPOINTMENTS
Patient has follow up appointment with Luisa Villaseñor on 7/2/2025 at 10:15                                        [phone]    Patient will need to call [facility] office to schedule 1-2 week follow up appointment. (Tried twice, sent to [facility])   [phone]
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
## 2025-06-18 NOTE — PROGRESS NOTES
Patient: Mark Ballard  Procedure(s):  TOTAL KNEE ARTHROPLASTY REVISION  Anesthesia type: spinal, regional    Patient location: [facility] Surgical Floor  Last vitals:   Vitals:    06/18/25 0906   BP: 100/65   Pulse: 71   Resp:    Temp:    SpO2:      Level of consciousness: awake, alert, and oriented    Post-anesthesia pain: adequate analgesia  Airway patency: patent  Respiratory: unassisted  Cardiovascular: stable and blood pressure at baseline  Hydration: euvolemic    Anesthetic complications: no. Moderate pain control. Did require 1 dose of IV pain medication. Denies any residual numbness despite having infusion running.

## 2025-06-18 NOTE — CASE MANAGEMENT/SOCIAL WORK
Discharge Planning Assessment  [facility]     Patient Name: Mark Ballard  MRN: [id number]  Today's Date: 6/18/2025    Admit Date: 6/17/2025    Plan: Home with wife and OP PT at [facility] rehab   Discharge Needs Assessment       Row Name 06/18/25 1018       Living Environment    People in Home spouse;child(rome), dependent    Name(s) of People in Home Ale, wife and his two son's ages 14 and 17    Current Living Arrangements home    Duration at Residence 17 Y    Potentially Unsafe Housing Conditions none    In the past 12 months has the electric, gas, oil, or water company threatened to shut off services in your home? No    Primary Care Provided by self    Provides Primary Care For no one    Caregiving Concerns pt voiced no care giving concerns at this time.    Family Caregiver if Needed spouse    Family Caregiver Names Ale wife    Quality of Family Relationships helpful;involved;supportive    Able to Return to Prior Arrangements yes    Living Arrangement Comments Patient states he lives with her wife and two sons in a single story house with three steps and no handrail to gain entry       Resource/Environmental Concerns    Resource/Environmental Concerns none    Transportation Concerns none       Transportation Needs    In the past 12 months, has lack of transportation kept you from medical appointments or from getting medications? no    In the past 12 months, has lack of transportation kept you from meetings, work, or from getting things needed for daily living? No       Food Insecurity    Within the past 12 months, you worried that your food would run out before you got the money to buy more. Never true       Transition Planning    Patient/Family Anticipates Transition to home with family    Patient/Family Anticipated Services at Transition ;durable medical equipment;outpatient care    Transportation Anticipated family or friend will provide  pt states his wife will be able to provide ride home  at discharge       Discharge Needs Assessment    Readmission Within the Last 30 Days no previous admission in last 30 days    Current Outpatient/Agency/Support Group --  none    Equipment Currently Used at Home commode    Concerns to be Addressed adjustment to diagnosis/illness;discharge planning    Concerns Comments pt will need a RW and OP PT at discharge    Anticipated Changes Related to Illness none    Equipment Needed After Discharge walker, rolling    Outpatient/Agency/Support Group Needs outpatient therapy    Discharge Facility/Level of Care Needs outpatient therapy    Patient's Choice of Community Agency(s) none    Current Discharge Risk --  none    Discharge Coordination/Progress Patient states he plans on returning home at discharge with his wife to help as needed and OP PT.                   Discharge Plan       Row Name 06/18/25 1023       Plan    Plan Home with wife and OP PT at [facility]    Patient/Family in Agreement with Plan yes    Plan Comments Into room and introduced self and role of CM. Discussed discharge disposition with patient and his wife with permission. Patient is currently resting in bed and voiced no complaints. Patient confirms the info on his face sheet is correct and he see's Caren WORTHINGTON as PCP. He states he normally uses [facility] and currently has no problem picking up or paying for his meds. He also states he does not have a living will and declines information regarding one. Patient states he lives with his wife and his two sons in a single story house with three steps and no handrail to gain entry and normally has no issues entering the home or maneuvering inside and does not currently have to use any sort of assistive device for mobility. He states he is independent with his ADLs, works and drives and  his wife will be able to provide ride home at discharge. He also states he  has a BSC at home and his rolling walker was delivered to him yesterday and is  currently in patient's room. CM will follow up at OP PT [facility] rehab for physical therapy tomorrow 6/19/25 @ 8:00am and verbalized understanding and is agreeable to this service. Patient states he plans on returning home at discharge wit his wife to help as needed and OP PT. He had no other questions. CM will follow.                  Continued Care and Services - Admitted Since 6/17/2025       Durable Medical Equipment       Service Provider Request Status Services Address Phone Fax Patient Preferred    [facility]  Selected Durable Medical Equipment [address], [place] [address] [phone] --                     Demographic Summary    No documentation.                  Functional Status    No documentation.                  Psychosocial    No documentation.                  Abuse/Neglect    No documentation.                  Legal    No documentation.                  Substance Abuse    No documentation.                  Patient Forms    No documentation.                     Zoe Osorio, RN

## 2025-06-18 NOTE — PROGRESS NOTES
Anesthesia Post Evaluation    Patient: Barby Osuna    Procedure(s) Performed: Procedure(s) (LRB):  LAPAROSCOPY, DIAGNOSTIC (N/A)  EXCISION, SMALL INTESTINE (N/A)    Final Anesthesia Type: general      Patient location during evaluation: PACU  Patient participation: Yes- Able to Participate  Level of consciousness: awake and alert  Post-procedure vital signs: reviewed and stable  Pain management: adequate  Airway patency: patent    PONV status at discharge: No PONV  Anesthetic complications: no      Cardiovascular status: hemodynamically stable  Respiratory status: unassisted  Hydration status: euvolemic  Follow-up not needed.              Vitals Value Taken Time   /70 03/25/25 13:01   Temp 36.6 °C (97.8 °F) 03/25/25 13:17   Pulse 70 03/25/25 13:17   Resp 18 03/25/25 13:55   SpO2 97 % 03/25/25 13:17         Event Time   Out of Recovery 03/25/2025 13:15:00         Pain/Tish Score: Pain Rating Prior to Med Admin: 7 (3/25/2025  1:55 PM)  Pain Rating Post Med Admin: 2 (3/25/2025  2:25 PM)  Tish Score: 10 (3/25/2025  1:00 PM)           POD# 1 s/p right TKA    Subjective:Patient states pain fairly well controlled overnight, denies fevers chills or sweats overnight, denies any residual numbness or tingling to operative extremity.  No calf pain or swelling.    Objective:  Vitals:    06/17/25 2045 06/17/25 2249 06/18/25 0317 06/18/25 0744   BP:  106/58 108/57 107/56   BP Location:  Left arm Left arm Left arm   Patient Position:  Lying Lying Lying   Pulse:  81 75 77   Resp:  18 18 18   Temp:  97.8 °F (36.6 °C) 97.8 °F (36.6 °C) 96.9 °F (36.1 °C)   TempSrc:  Oral Oral Oral   SpO2: 95% 99% 97%    Weight:       Height:           Results from last 7 days   Lab Units 06/18/25  0643   WBC 10*3/mm3 9.91   HEMOGLOBIN g/dL 11.8*   HEMATOCRIT % 34.9*   PLATELETS 10*3/mm3 293       Results from last 7 days   Lab Units 06/18/25  0643   SODIUM mmol/L 141   POTASSIUM mmol/L 3.6   CHLORIDE mmol/L 109*   CO2 mmol/L 22.4   BUN mg/dL 14.7   CREATININE mg/dL 0.84   GLUCOSE mg/dL 129*   CALCIUM mg/dL 8.1*       Exam:    Right knee- dressing clean, dry, intact   Flex and extend toes and ankle   No calf pain, negative Homans sign   Positive sensation light touch right foot   Brisk cap refill all digits, palpable dorsalis pedis pulse    Impression: s/p right TKA    Plan:  1. PT/OT- weight-bear as tolerated, ambulation, range of motion  2. Pain control-  oxycodone, Tylenol  3. DVT prophylaxis-  aspirin twice a day  4. Wound care-leave mesh and glue in place until follow-up visit  5. Disposition- home with outpatient PT once medically stable and cleared by PT
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
## 2025-06-18 NOTE — PLAN OF CARE
Problem: Adult Inpatient Plan of Care  Goal: Plan of Care Review  Recent Flowsheet Documentation  Taken 6/18/2025 0938 by Sidney Grimes, OTR  Outcome Evaluation: Occupational therapy. Pt s/p right knee revision. Pt managed bed mobility, transfers and functional mobility with modified independence. Pt reports no concerns for management of daily tasks upon discharge to home. Plan is for outpatient physical therapy services at [facility]. No further OT services recommended.
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
## 2025-06-19 ENCOUNTER — OFFICE VISIT (OUTPATIENT)
Dept: ORTHOPEDIC SURGERY | Facility: CLINIC | Age: 45
End: 2025-06-19
Payer: COMMERCIAL

## 2025-06-19 ENCOUNTER — READMISSION MANAGEMENT (OUTPATIENT)
Dept: CALL CENTER | Facility: HOSPITAL | Age: 45
End: 2025-06-19
Payer: COMMERCIAL

## 2025-06-19 ENCOUNTER — HOSPITAL ENCOUNTER (OUTPATIENT)
Dept: PHYSICAL THERAPY | Facility: HOSPITAL | Age: 45
Setting detail: THERAPIES SERIES
Discharge: HOME | End: 2025-06-19
Payer: COMMERCIAL

## 2025-06-19 VITALS — WEIGHT: 286 LBS | HEIGHT: 73 IN | BODY MASS INDEX: 37.91 KG/M2

## 2025-06-19 DIAGNOSIS — Z96.651 S/P REVISION OF TOTAL KNEE, RIGHT: Primary | ICD-10-CM

## 2025-06-19 DIAGNOSIS — Z96.651 STATUS POST TOTAL RIGHT KNEE REPLACEMENT: Primary | ICD-10-CM

## 2025-06-19 PROCEDURE — 99024 POSTOP FOLLOW-UP VISIT: CPT | Performed by: NURSE PRACTITIONER

## 2025-06-19 PROCEDURE — 97161 PT EVAL LOW COMPLEX 20 MIN: CPT | Performed by: PHYSICAL THERAPIST

## 2025-06-19 RX ORDER — DOXYCYCLINE 100 MG/1
100 CAPSULE ORAL 2 TIMES DAILY
Qty: 20 CAPSULE | Refills: 0 | Status: SHIPPED | OUTPATIENT
Start: 2025-06-19

## 2025-06-19 RX ORDER — PREGABALIN 75 MG/1
75 CAPSULE ORAL 2 TIMES DAILY
Qty: 60 CAPSULE | Refills: 0 | Status: SHIPPED | OUTPATIENT
Start: 2025-06-19

## 2025-06-19 RX ORDER — OXYCODONE AND ACETAMINOPHEN 7.5; 325 MG/1; MG/1
1 TABLET ORAL EVERY 4 HOURS PRN
Qty: 18 TABLET | Refills: 0 | Status: SHIPPED | OUTPATIENT
Start: 2025-06-19

## 2025-06-19 NOTE — OUTREACH NOTE
Prep Survey      Flowsheet Row Responses   [place] facility patient discharged from? [place]   Is LACE score < 7 ? Yes   Eligibility Readm Mgmt   Discharge diagnosis TOTAL KNEE ARTHROPLASTY REVISION   Does the patient have one of the following disease processes/diagnoses(primary or secondary)? Other   Does the patient have Home health ordered? No   Is there a DME ordered? Yes   What DME was ordered? [employer]-rolling walker   Comments regarding appointments plan OP PT at [facility] rehab   Prep survey completed? Yes            Caterina LUNA - Registered Nurse
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
## 2025-06-19 NOTE — CASE MANAGEMENT/SOCIAL WORK
Case Management Discharge Note      Final Note: DCd home         Selected Continued Care - Discharged on 6/18/2025 Admission date: 6/17/2025 - Discharge disposition: Home or Self Care      Destination    No services have been selected for the patient.                Durable Medical Equipment       Service Provider Services Address Phone Fax Patient Preferred    [facility] [address] [place] [id number] [phone] --              Dialysis/Infusion    No services have been selected for the patient.                Home Medical Care    No services have been selected for the patient.                Therapy    No services have been selected for the patient.                Community Resources    No services have been selected for the patient.                Community & DME    No services have been selected for the patient.                         Final Discharge Disposition Code: 01 - home or self-care
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
## 2025-06-20 DIAGNOSIS — Z96.651 STATUS POST TOTAL RIGHT KNEE REPLACEMENT: Primary | ICD-10-CM

## 2025-06-20 RX ORDER — METHYLPREDNISOLONE 4 MG/1
TABLET ORAL
Qty: 21 TABLET | Refills: 0 | Status: SHIPPED | OUTPATIENT
Start: 2025-06-20

## 2025-06-22 LAB
BACTERIA FLD CULT: NORMAL
GRAM STN SPEC: NORMAL
GRAM STN SPEC: NORMAL

## 2025-06-23 ENCOUNTER — HOSPITAL ENCOUNTER (OUTPATIENT)
Dept: PHYSICAL THERAPY | Facility: HOSPITAL | Age: 45
Setting detail: THERAPIES SERIES
Discharge: HOME | End: 2025-06-23
Payer: COMMERCIAL

## 2025-06-23 ENCOUNTER — OFFICE VISIT (OUTPATIENT)
Dept: ORTHOPEDIC SURGERY | Facility: CLINIC | Age: 45
End: 2025-06-23
Payer: COMMERCIAL

## 2025-06-23 VITALS — WEIGHT: 286 LBS | HEIGHT: 73 IN | BODY MASS INDEX: 37.91 KG/M2

## 2025-06-23 DIAGNOSIS — Z96.651 S/P TKR (TOTAL KNEE REPLACEMENT), RIGHT: Primary | ICD-10-CM

## 2025-06-23 DIAGNOSIS — Z96.651 S/P REVISION OF TOTAL KNEE, RIGHT: Primary | ICD-10-CM

## 2025-06-23 PROCEDURE — 99024 POSTOP FOLLOW-UP VISIT: CPT | Performed by: ORTHOPAEDIC SURGERY

## 2025-06-23 PROCEDURE — 97110 THERAPEUTIC EXERCISES: CPT | Performed by: PHYSICAL THERAPIST

## 2025-06-23 RX ORDER — CETIRIZINE HYDROCHLORIDE 10 MG/1
10 TABLET ORAL DAILY
COMMUNITY

## 2025-06-23 RX ORDER — PREDNISONE 10 MG/1
10 TABLET ORAL DAILY
Qty: 14 TABLET | Refills: 0 | Status: SHIPPED | OUTPATIENT
Start: 2025-06-23
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
## 2025-06-23 NOTE — PROGRESS NOTES
CC: Follow-up status post right revision total knee arthroplasty 6/17/2025    Interval history: Mark returns to clinic today, doing reasonably well with his knee motion but he is having a significant reaction to the glue and mesh to the anterior knee with some evidence of blistering as well as redness.  No evidence of dehiscence of the incision.  Steri-Strips in place with no evidence of reaction to these.    Discussed with patient that I have written him a prednisone taper-has been going to start that at this point.  Also recommended Benadryl intermittently as needed.  I would keep the area dry and reapply Steri-Strips as needed.  We will do some limitation in regards to his motion in order to not put additional stress over his knee but his subcutaneous and subcuticular suture appears to be holding well at this point.  Follow-up next week with Luisa as scheduled.  Continue doxycycline.

## 2025-06-24 ENCOUNTER — READMISSION MANAGEMENT (OUTPATIENT)
Dept: CALL CENTER | Facility: HOSPITAL | Age: 45
End: 2025-06-24
Payer: COMMERCIAL

## 2025-06-24 NOTE — OUTREACH NOTE
"LAG < 7 Survey      Flowsheet Row Responses   [facility] patient discharged from? [place]   Does the patient have one of the following disease processes/diagnoses(primary or secondary)? Other   BHLAG <7 Attempt successful? Yes   Call start time 0846   Call end time 0848   Discharge diagnosis TOTAL KNEE ARTHROPLASTY REVISION   Person spoke with today (if not patient) and relationship Spouse- valeriy   Meds reviewed with patient/caregiver? Yes   Does the patient have all medications ordered at discharge? Yes   Prescription comments START taking:  aspirin  Start taking on: June 18, 2025  BUPIVACAINE 0.125% IN 0.9% SODIUM CHLORIDE  400 ML ELASTOMERIC PUMP \"PAIN BALL\"  meloxicam (MOBIC)  naloxone (NARCAN)  ondansetron ODT (ZOFRAN-ODT)  oxyCODONE-acetaminophen (PERCOCET)  sennosides-docusate (PERICOLACE)  STOP taking:  diclofenac 75 MG EC tablet (VOLTAREN)  HYDROcodone-acetaminophen 5-325 MG per  tablet (NORCO)  TiZANidine 2 MG capsule (ZANAFLEX)   Is the patient taking all medications as directed (includes completed medication regime)? Yes   Does the patient have a primary care provider?  Yes   Comments regarding PCP Seen surgeon yesterday   What is the Home health agency?  [facility]   What DME was ordered? [employer]-rolling walker   Has all DME been delivered? Yes   Did the patient receive a copy of their discharge instructions? Yes   Nursing interventions Reviewed instructions with patient   What is the patient's perception of their health status since discharge? Improving   Is the patient/caregiver able to teach back signs and symptoms related to disease process for when to call PCP? Yes   Is the patient/caregiver able to teach back signs and symptoms related to disease process for when to call 911? Yes   Is the patient/caregiver able to teach back the hierarchy of who to call/visit for symptoms/problems? PCP, Specialist, Home health nurse, Urgent Care, ED, 911 Yes   Graduated Yes   Is the patient " interested in additional calls from an ambulatory ? No   Wrap up additional comments patient doing well. Having some adhesive issues- but going well. Patient going outpatient PT and has seen surgeon. No other concerns or questions noted.            Shalonda SANDOVAL - Registered Nurse

## 2025-06-26 ENCOUNTER — HOSPITAL ENCOUNTER (OUTPATIENT)
Dept: PHYSICAL THERAPY | Facility: HOSPITAL | Age: 45
Setting detail: THERAPIES SERIES
Discharge: HOME | End: 2025-06-26
Payer: COMMERCIAL

## 2025-06-26 DIAGNOSIS — Z96.651 S/P REVISION OF TOTAL KNEE, RIGHT: Primary | ICD-10-CM

## 2025-06-26 PROCEDURE — 97110 THERAPEUTIC EXERCISES: CPT | Performed by: PHYSICAL THERAPIST
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
## 2025-06-30 ENCOUNTER — HOSPITAL ENCOUNTER (OUTPATIENT)
Dept: PHYSICAL THERAPY | Facility: HOSPITAL | Age: 45
Setting detail: THERAPIES SERIES
Discharge: HOME | End: 2025-06-30
Payer: COMMERCIAL

## 2025-06-30 ENCOUNTER — OFFICE VISIT (OUTPATIENT)
Dept: ORTHOPEDIC SURGERY | Facility: CLINIC | Age: 45
End: 2025-06-30
Payer: COMMERCIAL

## 2025-06-30 VITALS — WEIGHT: 286 LBS | BODY MASS INDEX: 37.91 KG/M2 | HEIGHT: 73 IN

## 2025-06-30 DIAGNOSIS — Z96.651 STATUS POST TOTAL RIGHT KNEE REPLACEMENT: ICD-10-CM

## 2025-06-30 DIAGNOSIS — Z96.651 S/P REVISION OF TOTAL KNEE, RIGHT: Primary | ICD-10-CM

## 2025-06-30 PROCEDURE — 97110 THERAPEUTIC EXERCISES: CPT | Performed by: PHYSICAL THERAPIST

## 2025-06-30 PROCEDURE — 99024 POSTOP FOLLOW-UP VISIT: CPT | Performed by: NURSE PRACTITIONER

## 2025-06-30 RX ORDER — OXYCODONE AND ACETAMINOPHEN 7.5; 325 MG/1; MG/1
1 TABLET ORAL EVERY 4 HOURS PRN
Qty: 18 TABLET | Refills: 0 | Status: SHIPPED | OUTPATIENT
Start: 2025-06-30

## 2025-06-30 NOTE — PROGRESS NOTES
CC: s/p right total knee arthroplasty, DOS 06/17/2025    Interval History: Mark Ballard returns for 2 week postoperative visit.  He is doing well. Pain is controlled with pain medication and is  improving. He denies any wound problem, fevers, or chills. Patient is continuing to work with outpatient PT. Ambulating with walker. Continuing DVT prophylaxis with use of aspirin.     Physical Examination: Right knee was examined   Incision clean and dry healing, scabbing from blisters surrounding the incision appear to be healing no evidence of any infection free of erythema   ROM 0-4-124,  4/5 strength   Stable to varus and valgus stress   Flex/extend ankle and toes   Positive sensation right foot   No calf pain, negative Homans sign bilaterally    Assessment/Plan:  Mark Ballard is recovering from surgery as expected.  We will continue outpatient therapy for range of motion, strengthening, and gait normalization.    He is to follow up in clinic in 4 weeks with xrays. Patient had all question answered today. Will continue DVT prophylaxis for an additional 2 weeks. Discussed with patient to avoid immersing incision for 4 weeks total after surgery.     Medications:  New Medications Ordered This Visit   Medications    oxyCODONE-acetaminophen (PERCOCET) 7.5-325 MG per tablet     Sig: Take 1 tablet by mouth Every 4 (Four) Hours As Needed for Moderate Pain.     Dispense:  18 tablet     Refill:  0       ELIN Nova
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
## 2025-07-03 ENCOUNTER — HOSPITAL ENCOUNTER (OUTPATIENT)
Dept: PHYSICAL THERAPY | Facility: HOSPITAL | Age: 45
Setting detail: THERAPIES SERIES
Discharge: HOME | End: 2025-07-03
Payer: COMMERCIAL

## 2025-07-03 DIAGNOSIS — Z96.651 S/P REVISION OF TOTAL KNEE, RIGHT: Primary | ICD-10-CM

## 2025-07-03 PROCEDURE — 97140 MANUAL THERAPY 1/> REGIONS: CPT

## 2025-07-03 PROCEDURE — 97110 THERAPEUTIC EXERCISES: CPT
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
## 2025-07-07 ENCOUNTER — HOSPITAL ENCOUNTER (OUTPATIENT)
Dept: PHYSICAL THERAPY | Facility: HOSPITAL | Age: 45
Setting detail: THERAPIES SERIES
Discharge: HOME | End: 2025-07-07
Payer: COMMERCIAL

## 2025-07-07 DIAGNOSIS — Z96.651 S/P REVISION OF TOTAL KNEE, RIGHT: Primary | ICD-10-CM

## 2025-07-07 PROCEDURE — 97110 THERAPEUTIC EXERCISES: CPT | Performed by: PHYSICAL THERAPIST
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
## 2025-07-10 ENCOUNTER — HOSPITAL ENCOUNTER (OUTPATIENT)
Dept: PHYSICAL THERAPY | Facility: HOSPITAL | Age: 45
Setting detail: THERAPIES SERIES
Discharge: HOME | End: 2025-07-10
Payer: COMMERCIAL

## 2025-07-10 DIAGNOSIS — Z96.651 S/P REVISION OF TOTAL KNEE, RIGHT: Primary | ICD-10-CM

## 2025-07-10 PROCEDURE — 97110 THERAPEUTIC EXERCISES: CPT | Performed by: PHYSICAL THERAPIST
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
## 2025-07-16 ENCOUNTER — HOSPITAL ENCOUNTER (OUTPATIENT)
Dept: PHYSICAL THERAPY | Facility: HOSPITAL | Age: 45
Setting detail: THERAPIES SERIES
Discharge: HOME | End: 2025-07-16
Payer: COMMERCIAL

## 2025-07-16 DIAGNOSIS — Z96.651 S/P REVISION OF TOTAL KNEE, RIGHT: Primary | ICD-10-CM

## 2025-07-16 PROCEDURE — 97110 THERAPEUTIC EXERCISES: CPT | Performed by: PHYSICAL THERAPIST
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
## 2025-07-22 DIAGNOSIS — Z96.651 STATUS POST TOTAL RIGHT KNEE REPLACEMENT: ICD-10-CM

## 2025-07-22 RX ORDER — OXYCODONE AND ACETAMINOPHEN 7.5; 325 MG/1; MG/1
1 TABLET ORAL EVERY 4 HOURS PRN
Qty: 18 TABLET | Refills: 0 | Status: SHIPPED | OUTPATIENT
Start: 2025-07-22

## 2025-07-22 NOTE — TELEPHONE ENCOUNTER
Rx Refill Note  Requested Prescriptions      No prescriptions requested or ordered in this encounter      Last office visit with prescribing clinician: 6/23/2025      Next office visit with prescribing clinician: 7/28/2025   Last Filled:6/30/25    No diagnosis found.   Date of Surgery:s/p right total knee arthroplasty, DOS 06/17/2025       Tosha Monae MA  07/22/25, 15:48 EDT    Previous RX pended for your approval, change or denial.     {TIP  Encounters:    {TIP  Please add Last Relevant Lab Date if appropriate:  {TIP  Is Refill Pharmacy correct?:

## 2025-07-23 ENCOUNTER — HOSPITAL ENCOUNTER (OUTPATIENT)
Dept: PHYSICAL THERAPY | Facility: HOSPITAL | Age: 45
Setting detail: THERAPIES SERIES
Discharge: HOME | End: 2025-07-23
Payer: COMMERCIAL

## 2025-07-23 DIAGNOSIS — Z96.651 S/P REVISION OF TOTAL KNEE, RIGHT: Primary | ICD-10-CM

## 2025-07-23 PROCEDURE — 97110 THERAPEUTIC EXERCISES: CPT | Performed by: PHYSICAL THERAPIST
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
## 2025-07-28 ENCOUNTER — OFFICE VISIT (OUTPATIENT)
Dept: ORTHOPEDIC SURGERY | Facility: CLINIC | Age: 45
End: 2025-07-28
Payer: COMMERCIAL

## 2025-07-28 VITALS — BODY MASS INDEX: 37.91 KG/M2 | WEIGHT: 286 LBS | HEIGHT: 73 IN

## 2025-07-28 DIAGNOSIS — Z96.651 STATUS POST TOTAL RIGHT KNEE REPLACEMENT: Primary | ICD-10-CM

## 2025-07-28 PROCEDURE — 99024 POSTOP FOLLOW-UP VISIT: CPT | Performed by: ORTHOPAEDIC SURGERY

## 2025-07-28 NOTE — PROGRESS NOTES
CC: s/p right total knee arthroplasty-revision from partial knee arthroplasty, DOS 6/17/2025  Interval History: Mark Ballard returns for 6 week postoperative visit. He is doing well. Pain is controlled with pain medication and is improving. He denies any wound problem, fevers, or chills. Patient is continuing to work with outpatient PT. Ambulating without cane.     Physical Examination: Right knee was examined   Incision well healed   ROM 0-135,  4+/5 strength   Stable to varus and valgus stress   Flex/extend ankle and toes   Positive sensation right foot   No calf pain, negative Homans sign bilaterally    Radiographic review: Radiographs of the right knee 3 views, AP, lateral and patella axial views, compared to immediate postop films, indication s/p TKA,  shows that the implant is in good position. There is no evidence of implant loosening or osteolysis, no dislocation or periprosthetic fractures. Overall alignment acceptable at this time.     Assessment/Plan:  Mark Ballard is recovering from surgery as expected.  We will continue home therapy for range of motion, strengthening, and gait normalization. .  He is to follow up in clinic in 3 months with xrays. Patient had all question answered today. Discussed need for prophylactic antibiotics with dental/endoscopy procedures.     Medications:  No orders of the defined types were placed in this encounter.      Job Sanchez MD

## 2025-08-08 ENCOUNTER — TELEPHONE (OUTPATIENT)
Dept: ORTHOPEDIC SURGERY | Facility: CLINIC | Age: 45
End: 2025-08-08
Payer: COMMERCIAL

## 2025-08-08 RX ORDER — DICLOFENAC SODIUM 75 MG/1
75 TABLET, DELAYED RELEASE ORAL 2 TIMES DAILY
Qty: 60 TABLET | Refills: 2 | Status: SHIPPED | OUTPATIENT
Start: 2025-08-08

## 2025-08-08 RX ORDER — DICLOFENAC SODIUM 75 MG/1
75 TABLET, DELAYED RELEASE ORAL 2 TIMES DAILY
Qty: 60 TABLET | Refills: 3 | Status: CANCELLED | OUTPATIENT
Start: 2025-08-08

## (undated) DEVICE — DRP SURG U/DRP INVISISHIELD PA 48X52IN

## (undated) DEVICE — NEEDLE, QUINCKE 22GX3.5": Brand: MEDLINE INDUSTRIES, INC.

## (undated) DEVICE — DRSNG SURESITE WNDW 4X4.5

## (undated) DEVICE — ANTIBACTERIAL UNDYED BRAIDED (POLYGLACTIN 910), SYNTHETIC ABSORBABLE SUTURE: Brand: COATED VICRYL

## (undated) DEVICE — CAP GUIDE PSI/SIGNATURE/I-ASSIST

## (undated) DEVICE — INSTRUMENT 8670001 SXT GUIDEWIRE BLUNT

## (undated) DEVICE — Device: Brand: STABLECUT® OXFORD™

## (undated) DEVICE — SOL ISO/ALC 70PCT 4OZ

## (undated) DEVICE — NDL HYPO PRECISIONGLIDE REG 25G 1 1/2

## (undated) DEVICE — MEDI-VAC YANK SUCT HNDL W/TPRD BULBOUS TIP: Brand: CARDINAL HEALTH

## (undated) DEVICE — DISPOSABLE TOURNIQUET CUFF SINGLE BLADDER, SINGLE PORT AND QUICK CONNECT CONNECTOR: Brand: COLOR CUFF

## (undated) DEVICE — FRAZIER SUCTION INSTRUMENT 10 FR W/CONTROL VENT & OBTURATOR: Brand: FRAZIER

## (undated) DEVICE — DISPOSABLE TOURNIQUET CUFF 34"X4", 1-LINE, BLUE, STERILE, 1EA/PK, 10PK/CS: Brand: ASP MEDICAL

## (undated) DEVICE — PK KN TOTL 90

## (undated) DEVICE — SYS IRR SURGIPHOR A/MIC RTU BO PVPI 450ML STRL

## (undated) DEVICE — SPHR MARKR STEALTH STATION

## (undated) DEVICE — DRSNG TELFA PAD NONADH STR 1S 3X8IN

## (undated) DEVICE — DRP MICROSCP LEICA 137X381CM

## (undated) DEVICE — IMMOB KN 3PNL DLX CANVS 24IN BLU

## (undated) DEVICE — PATIENT RETURN ELECTRODE, SINGLE-USE, CONTACT QUALITY MONITORING, ADULT, WITH 9FT CORD, FOR PATIENTS WEIGING OVER 33LBS. (15KG): Brand: MEGADYNE

## (undated) DEVICE — SYS SKIN EXOFIN WND CLS 4X22CM

## (undated) DEVICE — GLOVE,SURG,SENSICARE,ALOE,LF,PF,7: Brand: MEDLINE

## (undated) DEVICE — TOOL MR8-15MH22 MR8 15CM MATCH 2.2MM: Brand: MIDAS REX MR8

## (undated) DEVICE — PAD,NON-ADHERENT,3X8,STERILE,LF,1/PK: Brand: MEDLINE

## (undated) DEVICE — ELECTRD BLD EZ CLN MOD 6.5IN

## (undated) DEVICE — GLV SURG SENSICARE PI LF PF 7.0

## (undated) DEVICE — TRANSFER SET 3": Brand: MEDLINE INDUSTRIES, INC.

## (undated) DEVICE — INTENDED USE FOR SURGICAL MARKING ON INTACT SKIN, ALSO PROVIDES A PERMANENT METHOD OF IDENTIFYING OBJECTS IN THE OPERATING ROOM: Brand: WRITESITE® REGULAR TIP SKIN MARKER

## (undated) DEVICE — SUT VIC 2/0 CT1 CR8 18IN J839D

## (undated) DEVICE — THIN OFFSET (9.0 X 0.38 X 25.0MM)

## (undated) DEVICE — MAT FLR ABSORBENT LG 4FT 10 2.5FT

## (undated) DEVICE — PK TOTL 90

## (undated) DEVICE — DRSNG WND BORDR/ADHS NONADHR/GZ LF 2X2IN STRL

## (undated) DEVICE — APPL CHLORAPREP HI/LITE 26ML ORNG

## (undated) DEVICE — GLV SURG SENSICARE PF POLYISPRN SZ8 LF

## (undated) DEVICE — ADHS SKIN SURG TISS VISC PREMIERPRO EXOFIN HI/VISC FAST/DRY

## (undated) DEVICE — PUMP PAIN AUTOFUSER AUTO SELCT NOBOLUS 1TO14ML/HR 550ML DISP

## (undated) DEVICE — SOL ISO/ALC RUB 70PCT 4OZ

## (undated) DEVICE — RECIPROCATING BLADE, DOUBLE SIDED, OFFSET  (70.0 X 1.0 X 12.5MM)

## (undated) DEVICE — DUAL CUT SAGITTAL BLADE

## (undated) DEVICE — 4-PORT MANIFOLD: Brand: NEPTUNE 2

## (undated) DEVICE — DECANTER BAG 9": Brand: MEDLINE INDUSTRIES, INC.

## (undated) DEVICE — DRSNG WND BORDR/ADHS NONADHR/GZ LF 4X4IN STRL

## (undated) DEVICE — PIN DRL NOHEAD TROC 3.2X75MM

## (undated) DEVICE — TRAP FLD MINIVAC MEGADYNE 100ML

## (undated) DEVICE — WRAP KN COMPR COLD/THERP

## (undated) DEVICE — YANKAUER,BULB TIP,W/O VENT,RIGID,STERILE: Brand: MEDLINE

## (undated) DEVICE — CONN TBG Y 5 IN 1 LF STRL

## (undated) DEVICE — KT MIX CMT PALAMIX/UNO VAC WO/CMT

## (undated) DEVICE — 3M™ STERI-DRAPE™ U-DRAPE 1015: Brand: STERI-DRAPE™

## (undated) DEVICE — SCRW HEX CORT HD 3.5X38MM
Type: IMPLANTABLE DEVICE | Site: KNEE | Status: NON-FUNCTIONAL
Removed: 2025-06-17

## (undated) DEVICE — LABEL SHEET CUSTOM 2X2 YELLOW: Brand: MEDLINE INDUSTRIES, INC.

## (undated) DEVICE — DRP STRL STERILEZ ZFLD

## (undated) DEVICE — CEMENT MIXING SYSTEM WITH FEMORAL BREAKWAY NOZZLE: Brand: REVOLUTION

## (undated) DEVICE — PENCL E/S ULTRAVAC TELESCP NOSE HOLSTR 10FT

## (undated) DEVICE — WRAP KNEE COLD THERAPY

## (undated) DEVICE — DRP SLUSH WARMR MACH CIR 44X44IN

## (undated) DEVICE — GLV SURG NEOLON 2G PF LF 7.5 STRL

## (undated) DEVICE — SPNG GZ WOVN 4X4IN 12PLY 10/BX STRL

## (undated) DEVICE — TOTAL TRAY, 16FR 10ML SIL FOLEY, URN: Brand: MEDLINE

## (undated) DEVICE — Device

## (undated) DEVICE — SUT MNCRYL PLS ANTIB UD 4/0 PS2 18IN

## (undated) DEVICE — STPLR SKIN VISISTAT WD 35CT

## (undated) DEVICE — FOAM BUMP ROUND LARGE: Brand: MEDLINE INDUSTRIES, INC.

## (undated) DEVICE — IMMOB KN 3PNL DLX CANVS 19IN BLU

## (undated) DEVICE — 3M™ DURAPORE™ SURGICAL TAPE 2 INCHES X 10YARDS (5.0CM X 9.1M) 6ROLLS/CARTON 10CARTONS/CASE 1538-2: Brand: 3M™ DURAPORE™

## (undated) DEVICE — INTENDED FOR TISSUE SEPARATION, AND OTHER PROCEDURES THAT REQUIRE A SHARP SURGICAL BLADE TO PUNCTURE OR CUT.: Brand: BARD-PARKER ® STAINLESS STEEL BLADES

## (undated) DEVICE — SOL IRR H2O BO 1000ML STRL

## (undated) DEVICE — SYS CLS SKIN PREMIERPRO EXOFINFUSION 22CM

## (undated) DEVICE — HOOD, PEEL-AWAY: Brand: FLYTE

## (undated) DEVICE — FRAZIER SURGICAL SUCTION INSTRUMENT: Brand: ARGYLE

## (undated) DEVICE — GUIDESCRW INST BONE/CUT MIS QUAD SPARING HEX/HD 33MM DISP

## (undated) DEVICE — GLV SURG SENSICARE PI PF LF 7 GRN STRL

## (undated) DEVICE — WEBRIL* CAST PADDING: Brand: DEROYAL

## (undated) DEVICE — KT CATH NERV BLCK ONQ QUIKBLCK 20GA 100MM

## (undated) DEVICE — PIN, 9733236, 150MM, STERILE, PERC REF

## (undated) DEVICE — PK NEURO SPINE 40

## (undated) DEVICE — BNDG,ELSTC,MATRIX,STRL,4"X5YD,LF,HOOK&LP: Brand: MEDLINE

## (undated) DEVICE — COVER,C-ARM,41X74: Brand: MEDLINE

## (undated) DEVICE — MARKR SKIN W/RULR 2TP

## (undated) DEVICE — PREP SOL POVIDONE/IODINE BT 4OZ

## (undated) DEVICE — SUT VIC 0 CT1 36IN J946H

## (undated) DEVICE — SPONGE,NEURO,.75"X.75",XR,STRL,LF,10/PK: Brand: MEDLINE